# Patient Record
Sex: MALE | Race: BLACK OR AFRICAN AMERICAN | Employment: FULL TIME | URBAN - METROPOLITAN AREA
[De-identification: names, ages, dates, MRNs, and addresses within clinical notes are randomized per-mention and may not be internally consistent; named-entity substitution may affect disease eponyms.]

---

## 2018-10-03 ENCOUNTER — HOSPITAL ENCOUNTER (EMERGENCY)
Age: 48
Discharge: LEFT BEFORE DISCHARGE | End: 2018-10-03

## 2018-10-03 VITALS
RESPIRATION RATE: 20 BRPM | HEIGHT: 65 IN | SYSTOLIC BLOOD PRESSURE: 188 MMHG | BODY MASS INDEX: 24.98 KG/M2 | HEART RATE: 111 BPM | OXYGEN SATURATION: 97 % | DIASTOLIC BLOOD PRESSURE: 115 MMHG | TEMPERATURE: 98.9 F | WEIGHT: 149.91 LBS

## 2018-10-03 PROCEDURE — 4500000002 HC ER NO CHARGE

## 2020-09-20 ENCOUNTER — HOSPITAL ENCOUNTER (EMERGENCY)
Age: 50
Discharge: HOME | End: 2020-09-20
Attending: EMERGENCY MEDICINE
Payer: COMMERCIAL

## 2020-09-20 VITALS
TEMPERATURE: 99.1 F | HEART RATE: 69 BPM | DIASTOLIC BLOOD PRESSURE: 84 MMHG | SYSTOLIC BLOOD PRESSURE: 131 MMHG | WEIGHT: 141.09 LBS | RESPIRATION RATE: 20 BRPM | OXYGEN SATURATION: 97 % | HEIGHT: 68 IN | BODY MASS INDEX: 21.38 KG/M2

## 2020-09-20 PROCEDURE — 99283 EMERGENCY DEPT VISIT LOW MDM: CPT

## 2020-09-20 PROCEDURE — 6370000000 HC RX 637 (ALT 250 FOR IP): Performed by: EMERGENCY MEDICINE

## 2020-09-20 RX ORDER — IBUPROFEN 600 MG/1
600 TABLET ORAL ONCE
Status: COMPLETED | OUTPATIENT
Start: 2020-09-20 | End: 2020-09-20

## 2020-09-20 RX ORDER — METHOCARBAMOL 750 MG/1
750 TABLET, FILM COATED ORAL 4 TIMES DAILY
Qty: 40 TABLET | Refills: 0 | Status: SHIPPED | OUTPATIENT
Start: 2020-09-20 | End: 2020-09-30

## 2020-09-20 RX ORDER — METHOCARBAMOL 750 MG/1
1500 TABLET, FILM COATED ORAL ONCE
Status: COMPLETED | OUTPATIENT
Start: 2020-09-20 | End: 2020-09-20

## 2020-09-20 RX ADMIN — IBUPROFEN 600 MG: 600 TABLET, FILM COATED ORAL at 13:41

## 2020-09-20 RX ADMIN — METHOCARBAMOL TABLETS 1500 MG: 750 TABLET, COATED ORAL at 13:41

## 2020-09-20 SDOH — HEALTH STABILITY: MENTAL HEALTH: HOW OFTEN DO YOU HAVE A DRINK CONTAINING ALCOHOL?: NEVER

## 2020-09-20 ASSESSMENT — ENCOUNTER SYMPTOMS
BACK PAIN: 1
BLOOD IN STOOL: 0
STRIDOR: 0
NAUSEA: 0
WHEEZING: 0
ABDOMINAL PAIN: 1
PHOTOPHOBIA: 0
VOICE CHANGE: 0
COLOR CHANGE: 0
TROUBLE SWALLOWING: 0
SHORTNESS OF BREATH: 0
FACIAL SWELLING: 0
VOMITING: 0

## 2020-09-20 ASSESSMENT — PAIN DESCRIPTION - LOCATION: LOCATION: ABDOMEN;BACK

## 2020-09-20 ASSESSMENT — PAIN SCALES - GENERAL
PAINLEVEL_OUTOF10: 8
PAINLEVEL_OUTOF10: 8

## 2020-09-20 ASSESSMENT — PAIN DESCRIPTION - PAIN TYPE: TYPE: CHRONIC PAIN

## 2020-09-20 ASSESSMENT — PAIN DESCRIPTION - DESCRIPTORS: DESCRIPTORS: SORE

## 2020-09-20 NOTE — ED NOTES
DAVID has examined pt already. Pt still reports abd pain, lower back and left upper back pain at 8.   Ice pack given     Samra Gomez, EUSEBIO  09/20/20 EUSEBIO Mitchell  09/20/20 5015

## 2020-09-20 NOTE — LETTER
[address] [facility] [address]  Phone: [phone]             September 20, 2020    Patient: Divine Hudson   YOB: 1970   Date of Visit: 9/20/2020       To Whom It May Concern:    Divine Hudson was seen and treated in our emergency department on 9/20/2020. He was accompanied by his wife, Elle Vaughn from 12:24 pm until time of discharge at 2:00 pm.  The Emergency Room physician asked for Elle Vaughn to stay home from work today to take care of her , Divine Hudson.       Sincerely,             Signature:__________________________________

## 2020-09-20 NOTE — ED PROVIDER NOTES
urinating and dysuria. Musculoskeletal: Positive for arthralgias and back pain. Negative for gait problem and neck pain. Skin: Negative for color change and wound. Neurological: Negative for seizures, syncope and speech difficulty. Psychiatric/Behavioral: Negative for self-injury and suicidal ideas. Except as noted above the remainder of the review of systems was reviewed and negative. PAST MEDICAL HISTORY     Past Medical History:   Diagnosis Date    Hernia, abdominal     Unspecified cerebral artery occlusion with cerebral infarction          SURGICAL HISTORY     History reviewed. No pertinent surgical history. CURRENT MEDICATIONS       Previous Medications    ASPIRIN 81 MG CHEWABLE TABLET    Take 81 mg by mouth daily       ALLERGIES     Pcn [penicillins]    FAMILY HISTORY     History reviewed. No pertinent family history.        SOCIAL HISTORY       Social History     Socioeconomic History    Marital status:      Spouse name: None    Number of children: None    Years of education: None    Highest education level: None   Occupational History    None   Social Needs    Financial resource strain: None    Food insecurity     Worry: None     Inability: None    Transportation needs     Medical: None     Non-medical: None   Tobacco Use    Smoking status: Current Every Day Smoker     Packs/day: 1.00     Types: Cigarettes    Smokeless tobacco: Never Used   Substance and Sexual Activity    Alcohol use: Never     Frequency: Never    Drug use: Never    Sexual activity: None   Lifestyle    Physical activity     Days per week: None     Minutes per session: None    Stress: None   Relationships    Social connections     Talks on phone: None     Gets together: None     Attends Evangelical service: None     Active member of club or organization: None     Attends meetings of clubs or organizations: None     Relationship status: None    Intimate partner violence     Fear of current or ex partner: None     Emotionally abused: None     Physically abused: None     Forced sexual activity: None   Other Topics Concern    None   Social History Narrative    None         PHYSICAL EXAM    (up to 7 for level 4, 8 or more for level 5)     ED Triage Vitals [09/20/20 1237]   BP Temp Temp Source Pulse Resp SpO2 Height Weight   131/84 99.1 °F (37.3 °C) Oral 69 20 97 % 5' 8\" (1.727 m) 141 lb 1.5 oz (64 kg)       Physical Exam  Vitals signs and nursing note reviewed. Constitutional:       General: He is not in acute distress. Appearance: He is well-developed. HENT:      Head: Normocephalic and atraumatic. Right Ear: External ear normal.      Left Ear: External ear normal.   Eyes:      Conjunctiva/sclera: Conjunctivae normal.   Neck:      Musculoskeletal: Neck supple. Vascular: No JVD. Trachea: No tracheal deviation. Cardiovascular:      Rate and Rhythm: Normal rate. Pulmonary:      Effort: Pulmonary effort is normal. No respiratory distress. Breath sounds: Normal breath sounds. No wheezing. Abdominal:      General: There is no distension. Palpations: Abdomen is soft. Tenderness: There is generalized abdominal tenderness. There is no guarding or rebound. Comments: Mild diffuse abdominal tenderness to palpation. Defect in ventral abdominal wall palpated but no protruding or incarcerated hernia palpable. No rebound, guarding, peritoneal signs. Musculoskeletal: Normal range of motion. Right shoulder: He exhibits tenderness (Mild left-sided tenderness to the mid thoracic and lower lumbar spine with no midline tenderness. No palpable deformities. ). Skin:     General: Skin is warm and dry. Neurological:      General: No focal deficit present. Mental Status: He is alert and oriented to person, place, and time. Cranial Nerves: No cranial nerve deficit. Comments: No saddle anesthesia.   Sensation intact in all nerve distributions of bilateral lower extremities. Normal gait on own power. Plus patellar reflexes equal bilaterally. EMERGENCY DEPARTMENT COURSE and DIFFERENTIAL DIAGNOSIS/MDM:   Vitals:    Vitals:    09/20/20 1237   BP: 131/84   Pulse: 69   Resp: 20   Temp: 99.1 °F (37.3 °C)   TempSrc: Oral   SpO2: 97%   Weight: 141 lb 1.5 oz (64 kg)   Height: 5' 8\" (1.727 m)         MDM  Patient has no emergent back pain signs such as urinary or bowel incontinence, saddle anesthesia, leg weakness or numbness, or fever. Has no midline tenderness. I suspect mostly musculoskeletal pain. Patient does have some abdominal pain I have recommended laboratory evaluation, urinalysis, and CT abdomen pelvis for further evaluation as well as to give me a chance to treat his back pain and reevaluate him. The patient and his wife initially agreed to this however shortly after I leave the room the patient's wife comes out and reports that they will like to speak to me again. When I returned to the room both the patient and his wife state that he does not want an IV, does not want a blood work, and does not want a prolonged evaluation. With reevaluation. They report that instead they would like medications to help with his back pain and discharge. They report that they are confident they can get in with his primary care doctor tomorrow for further evaluation. I have discussed the risk, benefits, alternatives to this strategy including decreased diagnostic accuracy and possibly missing emergent abdominal or back pathology. The patient and his wife both expressed understanding and agreement with this plan. I feel they adequately understand these risks and have capacity to precipitate your medical decision making and refuse the recommended work-up. They are discharged home with a prescription for muscle relaxers, recommendations for close primary follow-up, and very strict ER return precautions for new or worsening symptoms.          Procedures    FINAL IMPRESSION      1. Acute left-sided thoracic back pain    2. Lumbar back pain    3. Generalized abdominal pain          DISPOSITION/PLAN   DISPOSITION Decision To Discharge 09/20/2020 01:30:17 PM      PATIENT REFERRED TO:  Ruddy Bryson MD  [address] [place] [address],[address] [facility] [address] [id number]  [phone]    In 1 day      [address]  [place] [address]  [phone]    If symptoms worsen      DISCHARGE MEDICATIONS:  New Prescriptions    METHOCARBAMOL (ROBAXIN-750) 750 MG TABLET    Take 1 tablet by mouth 4 times daily for 10 days Use as needed for muscle pain or soreness. May take 2 at bedtime to aid sleep. (Please note that portions of this note were completed with a voice recognition program.  Efforts were made to edit the dictations but occasionally words aremis-transcribed. )    Char Vargas MD (electronically signed)  Attending Emergency Physician           Char Vargas MD  09/20/20 0659

## 2020-09-20 NOTE — LETTER
[facility] [address]  Phone: [phone]             September 20, 2020    Patient: Jade Ventura   YOB: 1970   Date of Visit: 9/20/2020       To Whom It May Concern:    Jade Ventura was seen and treated in our emergency department on 9/20/2020. He may return to work on 9/22/2020.       Sincerely,             Signature:__________________________________

## 2020-09-24 NOTE — ED NOTES
Discharge instructions with pt. Back pain/abd pain teaching with pt. Explained rx. Home ambulatory. Gait steady.    Pain still at [address] [place] [address]  09/23/20 [address]ccullough

## 2021-05-26 ENCOUNTER — HOSPITAL ENCOUNTER (EMERGENCY)
Age: 51
Discharge: HOME | End: 2021-05-26
Attending: EMERGENCY MEDICINE

## 2021-05-26 ENCOUNTER — APPOINTMENT (OUTPATIENT)
Dept: CT IMAGING | Age: 51
End: 2021-05-26

## 2021-05-26 VITALS
HEIGHT: 65 IN | HEART RATE: 68 BPM | BODY MASS INDEX: 26.85 KG/M2 | TEMPERATURE: 98 F | SYSTOLIC BLOOD PRESSURE: 138 MMHG | WEIGHT: 161.16 LBS | RESPIRATION RATE: 16 BRPM | DIASTOLIC BLOOD PRESSURE: 82 MMHG | OXYGEN SATURATION: 98 %
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
## 2021-05-26 DIAGNOSIS — S09.90XA CLOSED HEAD INJURY WITHOUT LOSS OF CONSCIOUSNESS, INITIAL ENCOUNTER: ICD-10-CM

## 2021-05-26 DIAGNOSIS — R60.0 PERIORBITAL EDEMA OF LEFT EYE: ICD-10-CM

## 2021-05-26 PROCEDURE — 70450 CT HEAD/BRAIN W/O DYE: CPT

## 2021-05-26 PROCEDURE — 70486 CT MAXILLOFACIAL W/O DYE: CPT

## 2021-05-26 PROCEDURE — 71250 CT THORAX DX C-: CPT

## 2021-05-26 PROCEDURE — 6370000000 HC RX 637 (ALT 250 FOR IP): Performed by: EMERGENCY MEDICINE

## 2021-05-26 PROCEDURE — 72125 CT NECK SPINE W/O DYE: CPT

## 2021-05-26 PROCEDURE — 99285 EMERGENCY DEPT VISIT HI MDM: CPT

## 2021-05-26 RX ORDER — TETRACAINE HYDROCHLORIDE 5 MG/ML
2 SOLUTION OPHTHALMIC ONCE
Status: COMPLETED | OUTPATIENT
Start: 2021-05-26 | End: 2021-05-26

## 2021-05-26 RX ORDER — ACETAMINOPHEN 500 MG
500 TABLET ORAL ONCE
Status: COMPLETED | OUTPATIENT
Start: 2021-05-26 | End: 2021-05-26

## 2021-05-26 RX ADMIN — TETRACAINE HYDROCHLORIDE 2 DROP: 5 SOLUTION OPHTHALMIC at 18:29

## 2021-05-26 RX ADMIN — FLUORESCEIN SODIUM 1 MG: 1 STRIP OPHTHALMIC at 18:29

## 2021-05-26 RX ADMIN — ACETAMINOPHEN 500 MG: 500 TABLET ORAL at 18:29

## 2021-05-26 ASSESSMENT — VISUAL ACUITY
OD: 20/25
OS: 20/30
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
## 2023-11-13 ENCOUNTER — APPOINTMENT (OUTPATIENT)
Dept: CT IMAGING | Age: 53
End: 2023-11-13

## 2023-11-13 ENCOUNTER — HOSPITAL ENCOUNTER (EMERGENCY)
Age: 53
Discharge: HOME | End: 2023-11-13
Attending: STUDENT IN AN ORGANIZED HEALTH CARE EDUCATION/TRAINING PROGRAM

## 2023-11-13 VITALS
HEART RATE: 70 BPM | TEMPERATURE: 98.5 F | DIASTOLIC BLOOD PRESSURE: 83 MMHG | WEIGHT: 171.08 LBS | BODY MASS INDEX: 28.5 KG/M2 | OXYGEN SATURATION: 96 % | HEIGHT: 65 IN | RESPIRATION RATE: 20 BRPM | SYSTOLIC BLOOD PRESSURE: 102 MMHG

## 2023-11-13 DIAGNOSIS — R03.0 ELEVATED BLOOD PRESSURE READING: ICD-10-CM

## 2023-11-13 DIAGNOSIS — F17.200 SMOKER: ICD-10-CM

## 2023-11-13 DIAGNOSIS — M86.9 OSTEOMYELITIS OF OTHER SITE, UNSPECIFIED TYPE (HCC): ICD-10-CM
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
## 2023-11-13 LAB
ALBUMIN SERPL-MCNC: 4.3 G/DL (ref 3.4–5)
ALBUMIN/GLOB SERPL: 1.3 {RATIO} (ref 1.1–2.2)
ALP SERPL-CCNC: 121 U/L (ref 40–129)
ALT SERPL-CCNC: 65 U/L (ref 10–40)
ANION GAP SERPL CALCULATED.3IONS-SCNC: 14 MMOL/L (ref 3–16)
AST SERPL-CCNC: 26 U/L (ref 15–37)
BASOPHILS # BLD: 0.1 K/UL (ref 0–0.2)
BASOPHILS NFR BLD: 0.8 %
BILIRUB SERPL-MCNC: <0.2 MG/DL (ref 0–1)
BUN SERPL-MCNC: 10 MG/DL (ref 7–20)
CALCIUM SERPL-MCNC: 9.4 MG/DL (ref 8.3–10.6)
CHLORIDE SERPL-SCNC: 105 MMOL/L (ref 99–110)
CO2 SERPL-SCNC: 24 MMOL/L (ref 21–32)
CREAT SERPL-MCNC: 0.8 MG/DL (ref 0.9–1.3)
DEPRECATED RDW RBC AUTO: 14.2 % (ref 12.4–15.4)
EOSINOPHIL # BLD: 0 K/UL (ref 0–0.6)
EOSINOPHIL NFR BLD: 0.1 %
ETHANOLAMINE SERPL-MCNC: NORMAL MG/DL (ref 0–0.08)
GFR SERPLBLD CREATININE-BSD FMLA CKD-EPI: >60 ML/MIN/{1.73_M2}
GLUCOSE SERPL-MCNC: 122 MG/DL (ref 70–99)
HCT VFR BLD AUTO: 41.9 % (ref 40.5–52.5)
HGB BLD-MCNC: 14.2 G/DL (ref 13.5–17.5)
LYMPHOCYTES # BLD: 2.7 K/UL (ref 1–5.1)
LYMPHOCYTES NFR BLD: 27.1 %
MCH RBC QN AUTO: 29.8 PG (ref 26–34)
MCHC RBC AUTO-ENTMCNC: 33.8 G/DL (ref 31–36)
MCV RBC AUTO: 88.2 FL (ref 80–100)
MONOCYTES # BLD: 0.7 K/UL (ref 0–1.3)
MONOCYTES NFR BLD: 7.4 %
NEUTROPHILS # BLD: 6.3 K/UL (ref 1.7–7.7)
NEUTROPHILS NFR BLD: 64.6 %
PLATELET # BLD AUTO: 257 K/UL (ref 135–450)
PMV BLD AUTO: 7.1 FL (ref 5–10.5)
POTASSIUM SERPL-SCNC: 3.7 MMOL/L (ref 3.5–5.1)
PROT SERPL-MCNC: 7.5 G/DL (ref 6.4–8.2)
RBC # BLD AUTO: 4.75 M/UL (ref 4.2–5.9)
SODIUM SERPL-SCNC: 143 MMOL/L (ref 136–145)
WBC # BLD AUTO: 9.8 K/UL (ref 4–11)

## 2023-11-13 PROCEDURE — 6360000002 HC RX W HCPCS: Performed by: STUDENT IN AN ORGANIZED HEALTH CARE EDUCATION/TRAINING PROGRAM

## 2023-11-13 PROCEDURE — 2580000003 HC RX 258

## 2023-11-13 PROCEDURE — 64400 NJX AA&/STRD TRIGEMINAL NRV: CPT

## 2023-11-13 PROCEDURE — 6360000002 HC RX W HCPCS

## 2023-11-13 PROCEDURE — 36415 COLL VENOUS BLD VENIPUNCTURE: CPT

## 2023-11-13 PROCEDURE — 70450 CT HEAD/BRAIN W/O DYE: CPT

## 2023-11-13 PROCEDURE — 85025 COMPLETE CBC W/AUTO DIFF WBC: CPT

## 2023-11-13 PROCEDURE — 72125 CT NECK SPINE W/O DYE: CPT

## 2023-11-13 PROCEDURE — 96365 THER/PROPH/DIAG IV INF INIT: CPT

## 2023-11-13 PROCEDURE — 96375 TX/PRO/DX INJ NEW DRUG ADDON: CPT

## 2023-11-13 PROCEDURE — 99284 EMERGENCY DEPT VISIT MOD MDM: CPT

## 2023-11-13 PROCEDURE — 82077 ASSAY SPEC XCP UR&BREATH IA: CPT

## 2023-11-13 PROCEDURE — 70486 CT MAXILLOFACIAL W/O DYE: CPT

## 2023-11-13 PROCEDURE — 80053 COMPREHEN METABOLIC PANEL: CPT

## 2023-11-13 PROCEDURE — 96367 TX/PROPH/DG ADDL SEQ IV INF: CPT

## 2023-11-13 PROCEDURE — 2580000003 HC RX 258: Performed by: STUDENT IN AN ORGANIZED HEALTH CARE EDUCATION/TRAINING PROGRAM

## 2023-11-13 RX ORDER — MORPHINE SULFATE 2 MG/ML
2 INJECTION, SOLUTION INTRAMUSCULAR; INTRAVENOUS ONCE
Status: COMPLETED | OUTPATIENT
Start: 2023-11-13 | End: 2023-11-13

## 2023-11-13 RX ORDER — ONDANSETRON 2 MG/ML
4 INJECTION INTRAMUSCULAR; INTRAVENOUS EVERY 30 MIN PRN
Status: DISCONTINUED | OUTPATIENT
Start: 2023-11-13 | End: 2023-11-13 | Stop reason: HOSPADM

## 2023-11-13 RX ORDER — SODIUM CHLORIDE, SODIUM LACTATE, POTASSIUM CHLORIDE, AND CALCIUM CHLORIDE .6; .31; .03; .02 G/100ML; G/100ML; G/100ML; G/100ML
1000 INJECTION, SOLUTION INTRAVENOUS ONCE
Status: COMPLETED | OUTPATIENT
Start: 2023-11-13 | End: 2023-11-13

## 2023-11-13 RX ORDER — METFORMIN HYDROCHLORIDE 500 MG/1
500 TABLET, EXTENDED RELEASE ORAL
COMMUNITY

## 2023-11-13 RX ORDER — BACLOFEN 10 MG/1
10 TABLET ORAL PRN
COMMUNITY

## 2023-11-13 RX ADMIN — SODIUM CHLORIDE, POTASSIUM CHLORIDE, SODIUM LACTATE AND CALCIUM CHLORIDE 1000 ML: 600; 310; 30; 20 INJECTION, SOLUTION INTRAVENOUS at 18:18

## 2023-11-13 RX ADMIN — ONDANSETRON 4 MG: 2 INJECTION INTRAMUSCULAR; INTRAVENOUS at 18:15

## 2023-11-13 RX ADMIN — VANCOMYCIN HYDROCHLORIDE 1500 MG: 1.5 INJECTION, POWDER, LYOPHILIZED, FOR SOLUTION INTRAVENOUS at 19:38

## 2023-11-13 RX ADMIN — MORPHINE SULFATE 2 MG: 2 INJECTION, SOLUTION INTRAMUSCULAR; INTRAVENOUS at 18:15

## 2023-11-13 RX ADMIN — CEFTRIAXONE 2000 MG: 1 INJECTION, POWDER, FOR SOLUTION INTRAMUSCULAR; INTRAVENOUS at 18:44

## 2023-11-13 ASSESSMENT — LIFESTYLE VARIABLES
HOW MANY STANDARD DRINKS CONTAINING ALCOHOL DO YOU HAVE ON A TYPICAL DAY: PATIENT DOES NOT DRINK
HOW OFTEN DO YOU HAVE A DRINK CONTAINING ALCOHOL: NEVER

## 2023-11-13 ASSESSMENT — PAIN SCALES - GENERAL
PAINLEVEL_OUTOF10: 10
PAINLEVEL_OUTOF10: 8
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
## 2023-11-13 NOTE — ED NOTES
Pt arrives ambulatory to ED c/o upper left tooth pain starting this morning. Pt currently does not have a dentist. No pain meds PTA. Rates pain 8/10.       Efe Soriano RN  11/13/23 6247
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
## 2023-11-13 NOTE — PROGRESS NOTES
Clinical Pharmacy Note  Vancomycin Consult    Pharmacy consult received for one-time dose of vancomycin in the Emergency Department per Dr. Subha Arteaga. Ht Readings from Last 1 Encounters:   11/13/23 1.651 m (5' 5\")        Wt Readings from Last 1 Encounters:   11/13/23 77.6 kg (171 lb 1.2 oz)         Assessment/Plan:  Vancomycin 1500mg x 1 in ED. If vancomycin is to continue on admission and pharmacy is to manage dosing, please re-consult with admission orders.

## 2023-11-14 NOTE — DISCHARGE INSTRUCTIONS
Is very important you go to the appointment tomorrow. Please follow-up with the oral surgeon as we discussed.   Go straight to the ER for any worsening symptoms

## 2023-11-14 NOTE — ED NOTES
[id number] wife at bedside and updated. NP to bedside to answer further questions from wife. Rocephin finished and LR infusion resumed after IV line flushed.      Jeff Chino RN  11/13/23 1910

## 2025-05-21 ENCOUNTER — APPOINTMENT (OUTPATIENT)
Dept: CT IMAGING | Age: 55
DRG: 057 | End: 2025-05-21
Payer: COMMERCIAL

## 2025-05-21 ENCOUNTER — APPOINTMENT (OUTPATIENT)
Dept: GENERAL RADIOLOGY | Age: 55
DRG: 057 | End: 2025-05-21
Payer: COMMERCIAL

## 2025-05-21 ENCOUNTER — HOSPITAL ENCOUNTER (INPATIENT)
Age: 55
LOS: 1 days | Discharge: HOME | DRG: 057 | End: 2025-05-24
Attending: EMERGENCY MEDICINE | Admitting: INTERNAL MEDICINE
Payer: COMMERCIAL

## 2025-05-21 DIAGNOSIS — R29.90 STROKE-LIKE SYMPTOM: Primary | ICD-10-CM

## 2025-05-21 DIAGNOSIS — R79.89 ELEVATED LFTS: ICD-10-CM

## 2025-05-21 DIAGNOSIS — I16.0 HYPERTENSIVE URGENCY: ICD-10-CM

## 2025-05-21 DIAGNOSIS — I63.9 CEREBROVASCULAR ACCIDENT (CVA), UNSPECIFIED MECHANISM (HCC): ICD-10-CM

## 2025-05-21 LAB
ALBUMIN SERPL-MCNC: 4.3 G/DL (ref 3.4–5)
ALBUMIN/GLOB SERPL: 1.4 {RATIO} (ref 1.1–2.2)
ALP SERPL-CCNC: 152 U/L (ref 40–129)
ALT SERPL-CCNC: 143 U/L (ref 10–40)
ANION GAP SERPL CALCULATED.3IONS-SCNC: 12 MMOL/L (ref 3–16)
AST SERPL-CCNC: 41 U/L (ref 15–37)
BASOPHILS # BLD: 0.1 K/UL (ref 0–0.2)
BASOPHILS NFR BLD: 0.8 %
BILIRUB SERPL-MCNC: <0.2 MG/DL (ref 0–1)
BUN SERPL-MCNC: 8 MG/DL (ref 7–20)
CALCIUM SERPL-MCNC: 9.5 MG/DL (ref 8.3–10.6)
CHLORIDE SERPL-SCNC: 102 MMOL/L (ref 99–110)
CHP ED QC CHECK: YES
CO2 SERPL-SCNC: 27 MMOL/L (ref 21–32)
CREAT SERPL-MCNC: 0.9 MG/DL (ref 0.9–1.3)
DEPRECATED RDW RBC AUTO: 13.5 % (ref 12.4–15.4)
EOSINOPHIL # BLD: 0.1 K/UL (ref 0–0.6)
EOSINOPHIL NFR BLD: 0.9 %
ETHANOLAMINE SERPL-MCNC: NORMAL MG/DL (ref 0–0.08)
GFR SERPLBLD CREATININE-BSD FMLA CKD-EPI: >90 ML/MIN/{1.73_M2}
GLUCOSE BLD-MCNC: 127 MG/DL
GLUCOSE BLD-MCNC: 127 MG/DL (ref 70–99)
GLUCOSE SERPL-MCNC: 140 MG/DL (ref 70–99)
HCT VFR BLD AUTO: 41.5 % (ref 40.5–52.5)
HGB BLD-MCNC: 14 G/DL (ref 13.5–17.5)
INR PPP: 0.9 (ref 0.85–1.15)
LACTATE BLDV-SCNC: 1.9 MMOL/L (ref 0.4–1.9)
LYMPHOCYTES # BLD: 3.3 K/UL (ref 1–5.1)
LYMPHOCYTES NFR BLD: 38.6 %
MCH RBC QN AUTO: 29.6 PG (ref 26–34)
MCHC RBC AUTO-ENTMCNC: 33.7 G/DL (ref 31–36)
MCV RBC AUTO: 87.8 FL (ref 80–100)
MONOCYTES # BLD: 0.4 K/UL (ref 0–1.3)
MONOCYTES NFR BLD: 5.1 %
NEUTROPHILS # BLD: 4.7 K/UL (ref 1.7–7.7)
NEUTROPHILS NFR BLD: 54.6 %
PERFORMED ON: ABNORMAL
PLATELET # BLD AUTO: 290 K/UL (ref 135–450)
PMV BLD AUTO: 7.1 FL (ref 5–10.5)
POTASSIUM SERPL-SCNC: 4.1 MMOL/L (ref 3.5–5.1)
PROT SERPL-MCNC: 7.3 G/DL (ref 6.4–8.2)
PROTHROMBIN TIME: 12.3 SEC (ref 11.9–14.9)
RBC # BLD AUTO: 4.73 M/UL (ref 4.2–5.9)
SODIUM SERPL-SCNC: 141 MMOL/L (ref 136–145)
TROPONIN, HIGH SENSITIVITY: 15 NG/L (ref 0–22)
WBC # BLD AUTO: 8.6 K/UL (ref 4–11)

## 2025-05-21 PROCEDURE — 83605 ASSAY OF LACTIC ACID: CPT

## 2025-05-21 PROCEDURE — 36415 COLL VENOUS BLD VENIPUNCTURE: CPT

## 2025-05-21 PROCEDURE — 99285 EMERGENCY DEPT VISIT HI MDM: CPT

## 2025-05-21 PROCEDURE — 6360000004 HC RX CONTRAST MEDICATION: Performed by: EMERGENCY MEDICINE

## 2025-05-21 PROCEDURE — 85610 PROTHROMBIN TIME: CPT

## 2025-05-21 PROCEDURE — 93005 ELECTROCARDIOGRAM TRACING: CPT | Performed by: EMERGENCY MEDICINE

## 2025-05-21 PROCEDURE — 84484 ASSAY OF TROPONIN QUANT: CPT

## 2025-05-21 PROCEDURE — 82077 ASSAY SPEC XCP UR&BREATH IA: CPT

## 2025-05-21 PROCEDURE — 80053 COMPREHEN METABOLIC PANEL: CPT

## 2025-05-21 PROCEDURE — 71045 X-RAY EXAM CHEST 1 VIEW: CPT

## 2025-05-21 PROCEDURE — 70496 CT ANGIOGRAPHY HEAD: CPT

## 2025-05-21 PROCEDURE — 74176 CT ABD & PELVIS W/O CONTRAST: CPT

## 2025-05-21 PROCEDURE — 85025 COMPLETE CBC W/AUTO DIFF WBC: CPT

## 2025-05-21 PROCEDURE — 70450 CT HEAD/BRAIN W/O DYE: CPT

## 2025-05-21 RX ORDER — IOPAMIDOL 755 MG/ML
75 INJECTION, SOLUTION INTRAVASCULAR
Status: COMPLETED | OUTPATIENT
Start: 2025-05-21 | End: 2025-05-21

## 2025-05-21 RX ADMIN — IOPAMIDOL 75 ML: 755 INJECTION, SOLUTION INTRAVENOUS at 20:59

## 2025-05-21 ASSESSMENT — PAIN - FUNCTIONAL ASSESSMENT: PAIN_FUNCTIONAL_ASSESSMENT: NONE - DENIES PAIN

## 2025-05-22 ENCOUNTER — APPOINTMENT (OUTPATIENT)
Dept: GENERAL RADIOLOGY | Age: 55
DRG: 057 | End: 2025-05-22
Payer: COMMERCIAL

## 2025-05-22 ENCOUNTER — APPOINTMENT (OUTPATIENT)
Age: 55
DRG: 057 | End: 2025-05-22
Attending: INTERNAL MEDICINE
Payer: COMMERCIAL

## 2025-05-22 ENCOUNTER — APPOINTMENT (OUTPATIENT)
Dept: MRI IMAGING | Age: 55
DRG: 057 | End: 2025-05-22
Payer: COMMERCIAL

## 2025-05-22 PROBLEM — I61.9 CVA (CEREBROVASCULAR ACCIDENT DUE TO INTRACEREBRAL HEMORRHAGE) (HCC): Status: ACTIVE | Noted: 2025-05-22

## 2025-05-22 LAB
AMMONIA PLAS-SCNC: 39 UMOL/L (ref 16–60)
AMPHETAMINES UR QL SCN>1000 NG/ML: ABNORMAL
ANION GAP SERPL CALCULATED.3IONS-SCNC: 13 MMOL/L (ref 3–16)
BARBITURATES UR QL SCN>200 NG/ML: ABNORMAL
BASOPHILS # BLD: 0.1 K/UL (ref 0–0.2)
BASOPHILS NFR BLD: 0.9 %
BENZODIAZ UR QL SCN>200 NG/ML: ABNORMAL
BUN SERPL-MCNC: 8 MG/DL (ref 7–20)
CALCIUM SERPL-MCNC: 9.9 MG/DL (ref 8.3–10.6)
CANNABINOIDS UR QL SCN>50 NG/ML: POSITIVE
CHLORIDE SERPL-SCNC: 102 MMOL/L (ref 99–110)
CK SERPL-CCNC: 220 U/L (ref 39–308)
CO2 SERPL-SCNC: 27 MMOL/L (ref 21–32)
COCAINE UR QL SCN: ABNORMAL
CREAT SERPL-MCNC: 0.8 MG/DL (ref 0.9–1.3)
DEPRECATED RDW RBC AUTO: 13.8 % (ref 12.4–15.4)
DRUG SCREEN COMMENT UR-IMP: ABNORMAL
ECHO AO ASC DIAM: 2.9 CM
ECHO AO ASCENDING AORTA INDEX: 1.64 CM/M2
ECHO AO ROOT DIAM: 3.1 CM
ECHO AO ROOT INDEX: 1.75 CM/M2
ECHO AV AREA PEAK VELOCITY: 2 CM2
ECHO AV AREA VTI: 2.3 CM2
ECHO AV AREA/BSA PEAK VELOCITY: 1.1 CM2/M2
ECHO AV AREA/BSA VTI: 1.3 CM2/M2
ECHO AV MEAN GRADIENT: 4 MMHG
ECHO AV MEAN VELOCITY: 1 M/S
ECHO AV PEAK GRADIENT: 9 MMHG
ECHO AV PEAK VELOCITY: 1.5 M/S
ECHO AV VELOCITY RATIO: 0.67
ECHO AV VTI: 29.3 CM
ECHO BSA: 1.78 M2
ECHO EST RA PRESSURE: 8 MMHG
ECHO IVC EXP: 2 CM
ECHO LA AREA 2C: 12.6 CM2
ECHO LA AREA 4C: 12.6 CM2
ECHO LA MAJOR AXIS: 4.5 CM
ECHO LA MINOR AXIS: 4.7 CM
ECHO LA VOL BP: 28 ML (ref 18–58)
ECHO LA VOL MOD A2C: 27 ML (ref 18–58)
ECHO LA VOL MOD A4C: 28 ML (ref 18–58)
ECHO LA VOL/BSA BIPLANE: 16 ML/M2 (ref 16–34)
ECHO LA VOLUME INDEX MOD A2C: 15 ML/M2 (ref 16–34)
ECHO LA VOLUME INDEX MOD A4C: 16 ML/M2 (ref 16–34)
ECHO LV E' LATERAL VELOCITY: 11.4 CM/S
ECHO LV E' SEPTAL VELOCITY: 10.3 CM/S
ECHO LV EF PHYSICIAN: 63 %
ECHO LV FRACTIONAL SHORTENING: 36 % (ref 28–44)
ECHO LV INTERNAL DIMENSION DIASTOLE INDEX: 2.49 CM/M2
ECHO LV INTERNAL DIMENSION DIASTOLIC: 4.4 CM (ref 4.2–5.9)
ECHO LV INTERNAL DIMENSION SYSTOLIC INDEX: 1.58 CM/M2
ECHO LV INTERNAL DIMENSION SYSTOLIC: 2.8 CM
ECHO LV IVSD: 1.2 CM (ref 0.6–1)
ECHO LV MASS 2D: 168.9 G (ref 88–224)
ECHO LV MASS INDEX 2D: 95.4 G/M2 (ref 49–115)
ECHO LV POSTERIOR WALL DIASTOLIC: 1 CM (ref 0.6–1)
ECHO LV RELATIVE WALL THICKNESS RATIO: 0.45
ECHO LVOT AREA: 2.8 CM2
ECHO LVOT AV VTI INDEX: 0.77
ECHO LVOT DIAM: 1.9 CM
ECHO LVOT MEAN GRADIENT: 2 MMHG
ECHO LVOT PEAK GRADIENT: 4 MMHG
ECHO LVOT PEAK VELOCITY: 1 M/S
ECHO LVOT STROKE VOLUME INDEX: 36 ML/M2
ECHO LVOT SV: 63.8 ML
ECHO LVOT VTI: 22.5 CM
ECHO MV A VELOCITY: 0.88 M/S
ECHO MV AREA VTI: 2.1 CM2
ECHO MV E DECELERATION TIME (DT): 194 MS
ECHO MV E VELOCITY: 0.97 M/S
ECHO MV E/A RATIO: 1.1
ECHO MV E/E' LATERAL: 8.51
ECHO MV E/E' RATIO (AVERAGED): 8.96
ECHO MV E/E' SEPTAL: 9.42
ECHO MV LVOT VTI INDEX: 1.32
ECHO MV MAX VELOCITY: 1 M/S
ECHO MV MEAN GRADIENT: 2 MMHG
ECHO MV MEAN VELOCITY: 0.6 M/S
ECHO MV PEAK GRADIENT: 4 MMHG
ECHO MV VTI: 29.8 CM
ECHO PV MAX VELOCITY: 1.1 M/S
ECHO PV PEAK GRADIENT: 5 MMHG
ECHO RA AREA 4C: 18.5 CM2
ECHO RA END SYSTOLIC VOLUME APICAL 4 CHAMBER INDEX BSA: 30 ML/M2
ECHO RA VOLUME: 53 ML
ECHO RIGHT VENTRICULAR SYSTOLIC PRESSURE (RVSP): 30 MMHG
ECHO RV BASAL DIMENSION: 3.8 CM
ECHO RV FREE WALL PEAK S': 11.7 CM/S
ECHO RV MID DIMENSION: 2.7 CM
ECHO RV TAPSE: 2.4 CM (ref 1.7–?)
ECHO TV REGURGITANT MAX VELOCITY: 2.32 M/S
ECHO TV REGURGITANT PEAK GRADIENT: 22 MMHG
EKG ATRIAL RATE: 65 BPM
EKG ATRIAL RATE: 84 BPM
EKG ATRIAL RATE: 90 BPM
EKG ATRIAL RATE: 98 BPM
EKG ATRIAL RATE: 99 BPM
EKG DIAGNOSIS: NORMAL
EKG P AXIS: 51 DEGREES
EKG P AXIS: 53 DEGREES
EKG P AXIS: 56 DEGREES
EKG P AXIS: 60 DEGREES
EKG P AXIS: 70 DEGREES
EKG P-R INTERVAL: 172 MS
EKG P-R INTERVAL: 172 MS
EKG P-R INTERVAL: 178 MS
EKG P-R INTERVAL: 180 MS
EKG P-R INTERVAL: 188 MS
EKG Q-T INTERVAL: 334 MS
EKG Q-T INTERVAL: 336 MS
EKG Q-T INTERVAL: 338 MS
EKG Q-T INTERVAL: 350 MS
EKG Q-T INTERVAL: 384 MS
EKG QRS DURATION: 76 MS
EKG QRS DURATION: 76 MS
EKG QRS DURATION: 78 MS
EKG QRS DURATION: 80 MS
EKG QRS DURATION: 80 MS
EKG QTC CALCULATION (BAZETT): 399 MS
EKG QTC CALCULATION (BAZETT): 413 MS
EKG QTC CALCULATION (BAZETT): 413 MS
EKG QTC CALCULATION (BAZETT): 428 MS
EKG QTC CALCULATION (BAZETT): 428 MS
EKG R AXIS: -1 DEGREES
EKG R AXIS: 19 DEGREES
EKG R AXIS: 22 DEGREES
EKG R AXIS: 4 DEGREES
EKG R AXIS: 51 DEGREES
EKG T AXIS: 26 DEGREES
EKG T AXIS: 32 DEGREES
EKG T AXIS: 37 DEGREES
EKG T AXIS: 40 DEGREES
EKG T AXIS: 51 DEGREES
EKG VENTRICULAR RATE: 65 BPM
EKG VENTRICULAR RATE: 84 BPM
EKG VENTRICULAR RATE: 90 BPM
EKG VENTRICULAR RATE: 98 BPM
EKG VENTRICULAR RATE: 99 BPM
EOSINOPHIL # BLD: 0 K/UL (ref 0–0.6)
EOSINOPHIL NFR BLD: 0.4 %
FENTANYL SCREEN, URINE: POSITIVE
GFR SERPLBLD CREATININE-BSD FMLA CKD-EPI: >90 ML/MIN/{1.73_M2}
GLUCOSE BLD-MCNC: 112 MG/DL (ref 70–99)
GLUCOSE BLD-MCNC: 119 MG/DL (ref 70–99)
GLUCOSE BLD-MCNC: 134 MG/DL (ref 70–99)
GLUCOSE BLD-MCNC: 150 MG/DL (ref 70–99)
GLUCOSE BLD-MCNC: 186 MG/DL (ref 70–99)
GLUCOSE SERPL-MCNC: 129 MG/DL (ref 70–99)
HCT VFR BLD AUTO: 44 % (ref 40.5–52.5)
HGB BLD-MCNC: 14.4 G/DL (ref 13.5–17.5)
LACTATE BLDV-SCNC: 1.6 MMOL/L (ref 0.4–2)
LACTATE BLDV-SCNC: 3.1 MMOL/L (ref 0.4–2)
LYMPHOCYTES # BLD: 4.3 K/UL (ref 1–5.1)
LYMPHOCYTES NFR BLD: 48.6 %
MCH RBC QN AUTO: 28.8 PG (ref 26–34)
MCHC RBC AUTO-ENTMCNC: 32.8 G/DL (ref 31–36)
MCV RBC AUTO: 87.9 FL (ref 80–100)
METHADONE UR QL SCN>300 NG/ML: ABNORMAL
MONOCYTES # BLD: 0.9 K/UL (ref 0–1.3)
MONOCYTES NFR BLD: 10.3 %
NEUTROPHILS # BLD: 3.5 K/UL (ref 1.7–7.7)
NEUTROPHILS NFR BLD: 39.8 %
OPIATES UR QL SCN>300 NG/ML: ABNORMAL
OXYCODONE UR QL SCN: ABNORMAL
PCP UR QL SCN>25 NG/ML: ABNORMAL
PERFORMED ON: ABNORMAL
PH UR STRIP: 7 [PH]
PLATELET # BLD AUTO: 275 K/UL (ref 135–450)
PMV BLD AUTO: 7.8 FL (ref 5–10.5)
POTASSIUM SERPL-SCNC: 3.6 MMOL/L (ref 3.5–5.1)
PROCALCITONIN SERPL IA-MCNC: 0.05 NG/ML (ref 0–0.15)
RBC # BLD AUTO: 5.01 M/UL (ref 4.2–5.9)
REPORT: NORMAL
RESP PATH DNA+RNA PNL NPH NAA+NON-PROBE: NORMAL
SODIUM SERPL-SCNC: 142 MMOL/L (ref 136–145)
TROPONIN, HIGH SENSITIVITY: 22 NG/L (ref 0–22)
TROPONIN, HIGH SENSITIVITY: 23 NG/L (ref 0–22)
TROPONIN, HIGH SENSITIVITY: 26 NG/L (ref 0–22)
WBC # BLD AUTO: 8.9 K/UL (ref 4–11)

## 2025-05-22 PROCEDURE — 83605 ASSAY OF LACTIC ACID: CPT

## 2025-05-22 PROCEDURE — 96375 TX/PRO/DX INJ NEW DRUG ADDON: CPT

## 2025-05-22 PROCEDURE — G0378 HOSPITAL OBSERVATION PER HR: HCPCS

## 2025-05-22 PROCEDURE — 36415 COLL VENOUS BLD VENIPUNCTURE: CPT

## 2025-05-22 PROCEDURE — 97161 PT EVAL LOW COMPLEX 20 MIN: CPT | Performed by: PHYSICAL THERAPIST

## 2025-05-22 PROCEDURE — 96366 THER/PROPH/DIAG IV INF ADDON: CPT

## 2025-05-22 PROCEDURE — 99223 1ST HOSP IP/OBS HIGH 75: CPT | Performed by: INTERNAL MEDICINE

## 2025-05-22 PROCEDURE — 6370000000 HC RX 637 (ALT 250 FOR IP): Performed by: HOSPITALIST

## 2025-05-22 PROCEDURE — 92526 ORAL FUNCTION THERAPY: CPT

## 2025-05-22 PROCEDURE — 93306 TTE W/DOPPLER COMPLETE: CPT

## 2025-05-22 PROCEDURE — 82140 ASSAY OF AMMONIA: CPT

## 2025-05-22 PROCEDURE — 93010 ELECTROCARDIOGRAM REPORT: CPT | Performed by: INTERNAL MEDICINE

## 2025-05-22 PROCEDURE — 0202U NFCT DS 22 TRGT SARS-COV-2: CPT

## 2025-05-22 PROCEDURE — 2580000003 HC RX 258: Performed by: INTERNAL MEDICINE

## 2025-05-22 PROCEDURE — 6370000000 HC RX 637 (ALT 250 FOR IP): Performed by: INTERNAL MEDICINE

## 2025-05-22 PROCEDURE — 92610 EVALUATE SWALLOWING FUNCTION: CPT

## 2025-05-22 PROCEDURE — 71045 X-RAY EXAM CHEST 1 VIEW: CPT

## 2025-05-22 PROCEDURE — 9990000010 HC NO CHARGE VISIT

## 2025-05-22 PROCEDURE — 92507 TX SP LANG VOICE COMM INDIV: CPT

## 2025-05-22 PROCEDURE — 84484 ASSAY OF TROPONIN QUANT: CPT

## 2025-05-22 PROCEDURE — 70551 MRI BRAIN STEM W/O DYE: CPT

## 2025-05-22 PROCEDURE — 96365 THER/PROPH/DIAG IV INF INIT: CPT

## 2025-05-22 PROCEDURE — 93306 TTE W/DOPPLER COMPLETE: CPT | Performed by: INTERNAL MEDICINE

## 2025-05-22 PROCEDURE — 82550 ASSAY OF CK (CPK): CPT

## 2025-05-22 PROCEDURE — 93005 ELECTROCARDIOGRAM TRACING: CPT | Performed by: HOSPITALIST

## 2025-05-22 PROCEDURE — 6360000002 HC RX W HCPCS: Performed by: HOSPITALIST

## 2025-05-22 PROCEDURE — 2500000003 HC RX 250 WO HCPCS: Performed by: HOSPITALIST

## 2025-05-22 PROCEDURE — 84145 PROCALCITONIN (PCT): CPT

## 2025-05-22 PROCEDURE — 96372 THER/PROPH/DIAG INJ SC/IM: CPT

## 2025-05-22 PROCEDURE — 6360000002 HC RX W HCPCS: Performed by: INTERNAL MEDICINE

## 2025-05-22 PROCEDURE — 92523 SPEECH SOUND LANG COMPREHEN: CPT

## 2025-05-22 PROCEDURE — 93005 ELECTROCARDIOGRAM TRACING: CPT

## 2025-05-22 PROCEDURE — 80307 DRUG TEST PRSMV CHEM ANLYZR: CPT

## 2025-05-22 PROCEDURE — 93005 ELECTROCARDIOGRAM TRACING: CPT | Performed by: EMERGENCY MEDICINE

## 2025-05-22 PROCEDURE — 94760 N-INVAS EAR/PLS OXIMETRY 1: CPT

## 2025-05-22 PROCEDURE — 2140000000 HC CCU INTERMEDIATE R&B

## 2025-05-22 PROCEDURE — 97530 THERAPEUTIC ACTIVITIES: CPT | Performed by: PHYSICAL THERAPIST

## 2025-05-22 PROCEDURE — 6360000002 HC RX W HCPCS

## 2025-05-22 PROCEDURE — 85025 COMPLETE CBC W/AUTO DIFF WBC: CPT

## 2025-05-22 PROCEDURE — 80048 BASIC METABOLIC PNL TOTAL CA: CPT

## 2025-05-22 RX ORDER — BACLOFEN 10 MG/1
10 TABLET ORAL PRN
Status: DISCONTINUED | OUTPATIENT
Start: 2025-05-22 | End: 2025-05-22

## 2025-05-22 RX ORDER — SODIUM CHLORIDE 0.9 % (FLUSH) 0.9 %
5-40 SYRINGE (ML) INJECTION PRN
Status: DISCONTINUED | OUTPATIENT
Start: 2025-05-22 | End: 2025-05-24 | Stop reason: HOSPADM

## 2025-05-22 RX ORDER — ONDANSETRON 4 MG/1
4 TABLET, ORALLY DISINTEGRATING ORAL EVERY 8 HOURS PRN
Status: DISCONTINUED | OUTPATIENT
Start: 2025-05-22 | End: 2025-05-24 | Stop reason: HOSPADM

## 2025-05-22 RX ORDER — GABAPENTIN 250 MG/5ML
600 SOLUTION ORAL 4 TIMES DAILY
Status: DISCONTINUED | OUTPATIENT
Start: 2025-05-22 | End: 2025-05-22

## 2025-05-22 RX ORDER — SODIUM CHLORIDE 0.9 % (FLUSH) 0.9 %
5-40 SYRINGE (ML) INJECTION EVERY 12 HOURS SCHEDULED
Status: DISCONTINUED | OUTPATIENT
Start: 2025-05-22 | End: 2025-05-24 | Stop reason: HOSPADM

## 2025-05-22 RX ORDER — ASPIRIN 81 MG/1
81 TABLET, CHEWABLE ORAL DAILY
Status: DISCONTINUED | OUTPATIENT
Start: 2025-05-22 | End: 2025-05-24 | Stop reason: HOSPADM

## 2025-05-22 RX ORDER — DEXTROSE MONOHYDRATE 100 MG/ML
INJECTION, SOLUTION INTRAVENOUS CONTINUOUS PRN
Status: DISCONTINUED | OUTPATIENT
Start: 2025-05-22 | End: 2025-05-24 | Stop reason: HOSPADM

## 2025-05-22 RX ORDER — NICOTINE 21 MG/24HR
1 PATCH, TRANSDERMAL 24 HOURS TRANSDERMAL DAILY
Status: DISCONTINUED | OUTPATIENT
Start: 2025-05-22 | End: 2025-05-24 | Stop reason: HOSPADM

## 2025-05-22 RX ORDER — LORAZEPAM 2 MG/ML
0.5 INJECTION INTRAMUSCULAR ONCE
Status: COMPLETED | OUTPATIENT
Start: 2025-05-22 | End: 2025-05-22

## 2025-05-22 RX ORDER — POLYETHYLENE GLYCOL 3350 17 G/17G
17 POWDER, FOR SOLUTION ORAL DAILY PRN
Status: DISCONTINUED | OUTPATIENT
Start: 2025-05-22 | End: 2025-05-24 | Stop reason: HOSPADM

## 2025-05-22 RX ORDER — ONDANSETRON 2 MG/ML
4 INJECTION INTRAMUSCULAR; INTRAVENOUS EVERY 6 HOURS PRN
Status: DISCONTINUED | OUTPATIENT
Start: 2025-05-22 | End: 2025-05-24 | Stop reason: HOSPADM

## 2025-05-22 RX ORDER — LORAZEPAM 2 MG/ML
INJECTION INTRAMUSCULAR
Status: DISPENSED
Start: 2025-05-22 | End: 2025-05-22

## 2025-05-22 RX ORDER — PREGABALIN 50 MG/1
50 CAPSULE ORAL 3 TIMES DAILY
COMMUNITY

## 2025-05-22 RX ORDER — GLUCAGON 1 MG/ML
1 KIT INJECTION PRN
Status: DISCONTINUED | OUTPATIENT
Start: 2025-05-22 | End: 2025-05-24 | Stop reason: HOSPADM

## 2025-05-22 RX ORDER — HYDROCHLOROTHIAZIDE 12.5 MG/1
12.5 CAPSULE ORAL DAILY
COMMUNITY

## 2025-05-22 RX ORDER — TENOFOVIR DISOPROXIL FUMARATE 300 MG/1
300 TABLET, FILM COATED ORAL DAILY
COMMUNITY

## 2025-05-22 RX ORDER — LORAZEPAM 2 MG/ML
0.5 INJECTION INTRAMUSCULAR EVERY 6 HOURS PRN
Status: DISCONTINUED | OUTPATIENT
Start: 2025-05-22 | End: 2025-05-22

## 2025-05-22 RX ORDER — LORAZEPAM 2 MG/ML
INJECTION INTRAMUSCULAR
Status: COMPLETED | OUTPATIENT
Start: 2025-05-22 | End: 2025-05-22

## 2025-05-22 RX ORDER — INSULIN LISPRO 100 [IU]/ML
0-4 INJECTION, SOLUTION INTRAVENOUS; SUBCUTANEOUS
Status: DISCONTINUED | OUTPATIENT
Start: 2025-05-22 | End: 2025-05-24 | Stop reason: HOSPADM

## 2025-05-22 RX ORDER — ASPIRIN 300 MG/1
300 SUPPOSITORY RECTAL DAILY
Status: DISCONTINUED | OUTPATIENT
Start: 2025-05-22 | End: 2025-05-24 | Stop reason: HOSPADM

## 2025-05-22 RX ORDER — M-VIT,TX,IRON,MINS/CALC/FOLIC 27MG-0.4MG
1 TABLET ORAL DAILY
COMMUNITY

## 2025-05-22 RX ORDER — ROSUVASTATIN CALCIUM 10 MG/1
40 TABLET, COATED ORAL NIGHTLY
Status: DISCONTINUED | OUTPATIENT
Start: 2025-05-22 | End: 2025-05-24 | Stop reason: HOSPADM

## 2025-05-22 RX ORDER — ENOXAPARIN SODIUM 100 MG/ML
40 INJECTION SUBCUTANEOUS DAILY
Status: DISCONTINUED | OUTPATIENT
Start: 2025-05-22 | End: 2025-05-24 | Stop reason: HOSPADM

## 2025-05-22 RX ORDER — 0.9 % SODIUM CHLORIDE 0.9 %
30 INTRAVENOUS SOLUTION INTRAVENOUS ONCE
Status: COMPLETED | OUTPATIENT
Start: 2025-05-22 | End: 2025-05-22

## 2025-05-22 RX ORDER — TENOFOVIR DISOPROXIL FUMARATE 300 MG/1
300 TABLET, FILM COATED ORAL DAILY
Status: DISCONTINUED | OUTPATIENT
Start: 2025-05-22 | End: 2025-05-24 | Stop reason: HOSPADM

## 2025-05-22 RX ORDER — SODIUM CHLORIDE 9 MG/ML
INJECTION, SOLUTION INTRAVENOUS PRN
Status: DISCONTINUED | OUTPATIENT
Start: 2025-05-22 | End: 2025-05-24 | Stop reason: HOSPADM

## 2025-05-22 RX ORDER — ROSUVASTATIN CALCIUM 20 MG/1
20 TABLET, COATED ORAL DAILY
COMMUNITY

## 2025-05-22 RX ADMIN — CEFEPIME 2000 MG: 2 INJECTION, POWDER, FOR SOLUTION INTRAVENOUS at 17:54

## 2025-05-22 RX ADMIN — LORAZEPAM 0.5 MG: 2 INJECTION INTRAMUSCULAR; INTRAVENOUS at 09:30

## 2025-05-22 RX ADMIN — ASPIRIN 81 MG: 81 TABLET, CHEWABLE ORAL at 09:10

## 2025-05-22 RX ADMIN — ROSUVASTATIN CALCIUM 40 MG: 10 TABLET, FILM COATED ORAL at 20:53

## 2025-05-22 RX ADMIN — TENOFOVIR DISPROXIL FUMARATE 300 MG: 300 TABLET ORAL at 17:55

## 2025-05-22 RX ADMIN — SODIUM CHLORIDE, PRESERVATIVE FREE 10 ML: 5 INJECTION INTRAVENOUS at 20:54

## 2025-05-22 RX ADMIN — CEFEPIME 2000 MG: 2 INJECTION, POWDER, FOR SOLUTION INTRAVENOUS at 10:30

## 2025-05-22 RX ADMIN — LORAZEPAM 0.5 MG: 2 INJECTION INTRAMUSCULAR; INTRAVENOUS at 09:32

## 2025-05-22 RX ADMIN — ENOXAPARIN SODIUM 40 MG: 100 INJECTION SUBCUTANEOUS at 09:11

## 2025-05-22 RX ADMIN — SODIUM CHLORIDE, PRESERVATIVE FREE 10 ML: 5 INJECTION INTRAVENOUS at 09:12

## 2025-05-22 RX ADMIN — SODIUM CHLORIDE 1845 ML: 0.9 INJECTION, SOLUTION INTRAVENOUS at 13:37

## 2025-05-22 RX ADMIN — INSULIN LISPRO 1 UNITS: 100 INJECTION, SOLUTION INTRAVENOUS; SUBCUTANEOUS at 20:53

## 2025-05-22 ASSESSMENT — PAIN SCALES - GENERAL
PAINLEVEL_OUTOF10: 4
PAINLEVEL_OUTOF10: 4

## 2025-05-22 ASSESSMENT — PAIN - FUNCTIONAL ASSESSMENT: PAIN_FUNCTIONAL_ASSESSMENT: ACTIVITIES ARE NOT PREVENTED

## 2025-05-22 ASSESSMENT — PAIN DESCRIPTION - DESCRIPTORS: DESCRIPTORS: ACHING

## 2025-05-22 ASSESSMENT — PAIN DESCRIPTION - FREQUENCY: FREQUENCY: CONTINUOUS

## 2025-05-22 ASSESSMENT — PAIN DESCRIPTION - ONSET: ONSET: ON-GOING

## 2025-05-22 ASSESSMENT — PAIN DESCRIPTION - ORIENTATION: ORIENTATION: MID

## 2025-05-22 ASSESSMENT — PAIN DESCRIPTION - LOCATION
LOCATION: CHEST
LOCATION: CHEST

## 2025-05-22 ASSESSMENT — PAIN DESCRIPTION - DIRECTION: RADIATING_TOWARDS: NECK

## 2025-05-22 NOTE — ED PROVIDER NOTES
EMERGENCY DEPARTMENT ENCOUNTER     [facility] EMERGENCY DEPARTMENT     Pt Name: Cassia Morris   MRN: [id number]   Birthdate 1970   Date of evaluation: 5/21/2025   Provider: Adolfo Díaz MD   PCP: Yon Arellano MD   Note Started: 8:49 PM EDT 5/21/25     CHIEF COMPLAINT     Chief Complaint   Patient presents with    Tachycardia     Pt states that he has a history of a stroke. Pt states that he has residual left sided weakness, left sided numbness in face, arm, leg,foot,and memory loss. Pt states that he feels different. Pt states symptoms started 2 hours ago.         HISTORY OF PRESENT ILLNESS:  History from : Patient        Cassia Morris is a 54 y.o. male who presents for evaluation of chest pain palpitations and sensory changes and weakness to the left side of his body.  Patient reports that symptom onset was approximately 2 hours prior to presentation.  States that his symptoms seem to be worsening since onset.  He reports he had similar symptoms 2 days prior that resolved spontaneously.  He reports a history of previous stroke that affected the left side of his body.  He did have chronic left-sided weakness but states that the weakness is worse than normal.  Denies recent head injury or trauma.  He is does not take anticoagulants but is on aspirin.  Patient also reports having left-sided chest pain.  He states that he feels that his heart is racing.     Nursing Notes were all reviewed and agreed with or any disagreements were addressed in the HPI.     ROS: Positives and Pertinent negatives as per HPI.    PAST MEDICAL HISTORY     Past medical history:  has a past medical history of Hepatitis B, Hernia, abdominal, and Unspecified cerebral artery occlusion with cerebral infarction.    Past surgical history:  has no past surgical history on file.    Med list:   No current facility-administered medications on file prior to encounter.     Current Outpatient Medications on File Prior to Encounter

## 2025-05-22 NOTE — PROGRESS NOTES
Physical Therapy    [facility] - Physical Therapy   Phone: [phone]    Physical Therapy  Facility/Department:[address] PROGRESSIVE CARE    [x] Initial Evaluation            [x] Daily Treatment Note         [] Discharge Summary      Patient: Cassia Morris   : 1970   MRN: [id number]   Date of Service:  2025  Staff Mobility Recommendation: SNA with A of 1    AM-PAC score: 19/24  Discharge Recommendations: Home with HHPT and assist from wife  Cassia Morris scored a 19/24 on the AM-PAC short mobility form. Current research shows that an AM-PAC score of 18 or greater is typically associated with a discharge to the patient's home setting. Based on the patient's AM-PAC score and their current functional mobility deficits, it is recommended that the patient have 2-3 sessions per week of Physical Therapy at d/c to increase the patient's independence.  At this time, this patient demonstrates the endurance and safety to discharge home with Home PT and a follow up treatment frequency of 2-3x/wk.  Please see assessment section for further patient specific details.    If patient discharges prior to next session this note will serve as a discharge summary.  Please see below for the latest assessment towards goals.       Admitting Diagnosis: CVA (cerebrovascular accident due to intracerebral hemorrhage) ([facility])  Ordering Physician: Dr Nguyen  Current Admission Summary: 54m with history of prior history of CVA, continued tobacco abuse, presents to the ER with L facial numbness and weakness. Patient states he had episode of weakness 2 nights ago, which resolved only to recur this evening around 7pm prompting ER consultation. Facial weakness has been associated with difficulty swallowing. Patient has also had chest pain, left sided, intermittent for several days as well.     Past Medical History:  has a past medical history of Hepatitis B, Hernia, abdominal, and Unspecified cerebral artery occlusion with cerebral

## 2025-05-22 NOTE — PROGRESS NOTES
4 Eyes Skin Assessment     NAME:  Cassia Morris  YOB: 1970  MEDICAL RECORD NUMBER:  [id number]    The patient is being assessed for  Admission    I agree that at least one RN has performed a thorough Head to Toe Skin Assessment on the patient. ALL assessment sites listed below have been assessed.      Areas assessed by both nurses:    Head, Face, Ears, Shoulders, Back, Chest, Arms, Elbows, Hands, Sacrum. Buttock, Coccyx, Ischium, and Legs. Feet and Heels        Does the Patient have a Wound? No noted wound(s)       Ranjith Prevention initiated by RN: No  Wound Care Orders initiated by RN: No    Pressure Injury (Stage 3,4, Unstageable, DTI, NWPT, and Complex wounds) if present, place Wound referral order by RN under : No    New Ostomies, if present place, Ostomy referral order under : No     Nurse 1 eSignature: Electronically signed by Izabella Salazar RN on 5/22/25 at 4:22 AM EDT    **SHARE this note so that the co-signing nurse can place an eSignature**    Nurse 2 eSignature: Electronically signed by CRISTIANA IVORY RN on 5/22/25 at 4:25 AM EDT

## 2025-05-22 NOTE — PROGRESS NOTES
Facility/Department: [address] CARE  SLP Clinical Swallow Evaluation and Speech Language Cognitive Assessment     Patient: Cassia Morris   : 1970   MRN: [id number]      Evaluation Date: 2025      Admitting Dx:   CVA (cerebrovascular accident due to intracerebral hemorrhage) (HCC) [I61.9]  Elevated LFTs [R79.89]  Hypertensive urgency [I16.0]  Stroke-like symptom [R29.90]   has a past medical history of Hepatitis B, Hernia, abdominal, and Unspecified cerebral artery occlusion with cerebral infarction.   has no past surgical history on file.  Allergies: medication allergies noted  Dysphagia History including instrumental assessment: no SLP notes on record  GI history: initial GI visit (2024) d/t new dysphagia complaint (heartburn x 3 months with some dysphagia (mid swallow, liquids/solids), Intermittent chest discomfort x 8 months); 10/11/2024 EUS/EGD: \"No endoscopic abnormality was evident in the esophagus to explain the patient's complaint of dysphagia. Biopsies were obtained from the proximal and distal esophagus with cold forceps for histology of suspected eosinophilic esophagitis. Diffuse mild inflammation characterized by congestion (edema) and erythema was found in the gastric antrum. Random biopsies were obtained in the gastric body, at the incisura and in the gastric antrum with cold forceps for histology. The duodenal bulb and second portion of the duodenum were normal.\" Most recently seen 2025: \"Still has daily nausea with vomiting 2-3 days week (multiple times/day, non bloody, sometimes bilious). Ondansetron not as helpful, promethazine is but sedating. Eating less, wt stable. Dysphagia still same (solids, liquids, pills), no odynophagia. Heartburn better.\" A/P notes with potential need for repeat scope, concern for metformin contributing to GI sx, rec to continue ondansetron and promethazine (with caution), avoid NSAIDs, H pylori eradication test on hold d/t PPI  Baseline/Prior  Not assessed d/t med status changes during assessment; concern for reduced recall from baseline with potential for reduced problem solving and reasoning as well.    Medical Or Cognitive/Behavioral Factors Which Can Exacerbate:   Comorbidities  Med status  GI involvements    Per chart and history provided, patient appears to have a status change contributing to below baseline level of function    Dysphagia Prognosis: good, guarded  Barriers to Progress: co-morbidities, GI involvements    SLP treatment Prognosis: good, guarded  Barriers to Progress:  co-morbidities, medical dx, concern for altered status during assessment    RECOMMENDATIONS     Recommended Diet and Intervention if deemed medically appropriate pending medical status changes:  Diet Solids Recommendation:  Regular texture Liquid Consistency Recommendation:  Thin liquids   Recommended form of Meds:   PO per patient preference      Compensatory Swallowing Strategies:  Eat or Feed Slowly, Remain Upright 30-45 min , Small Bites and Sips , Upright as possible with all PO intake     Eating Assistance Recommendation: Supervision or touching assistance    Treatment recommendations: Cognitive-Linguistic, Motor speech / Voice , Dysphagia  Frequency of treatment: 2-5 times/week    Dysphagia Therapeutic Intervention: Diet Tolerance Monitoring , Patient/Family Education   SLP Therapeutic Intervention: Dysarthria, Cognitive-Linguistic    Discharge Recommendations:  Recommend ongoing SLP for speech therapy upon discharge from the hospital     GOALS / PLAN     GOALS:  SHORT TERM DYSPHAGIA GOALS  Pt will functionally tolerate recommended diet with no overt clinical s/s of aspiration  Patient will demonstrate carryover of specific compensatory swallow strategies (ie, postures, techniques,...) with set-up  SHORT TERM SPEECH/LANGUAGE/COGNITIVE GOALS  Patient will demonstrate improved intelligibility of multi-syllable utterances, via oral motor and oral motor speech

## 2025-05-22 NOTE — PROGRESS NOTES
4 Eyes Skin Assessment     NAME:  Cassia Morris  YOB: 1970  MEDICAL RECORD NUMBER:  [id number]    The patient is being assessed for  Transfer to New Unit    I agree that at least one RN has performed a thorough Head to Toe Skin Assessment on the patient. ALL assessment sites listed below have been assessed.      Areas assessed by both nurses:    Head, Face, Ears, Shoulders, Back, Chest, Arms, Elbows, Hands, Sacrum. Buttock, Coccyx, Ischium, Legs. Feet and Heels, and Under Medical Devices         Does the Patient have a Wound? No noted wound(s)       Ranjith Prevention initiated by RN: Yes  Wound Care Orders initiated by RN: No    Pressure Injury (Stage 3,4, Unstageable, DTI, NWPT, and Complex wounds) if present, place Wound referral order by RN under : No    New Ostomies, if present place, Ostomy referral order under : No     Nurse 1 eSignature: Electronically signed by Sincere Sommer RN on 5/22/25 at 11:33 AM EDT    **SHARE this note so that the co-signing nurse can place an eSignature**    Nurse 2 eSignature: Electronically signed by Tressa Tolbert RN on 5/22/25 at 11:35 AM EDT    Is This A New Presentation, Or A Follow-Up?: Skin Lesion What Type Of Note Output Would You Prefer (Optional)?: Standard Output How Severe Is Your Skin Lesion?: mild Has Your Skin Lesion Been Treated?: not been treated

## 2025-05-22 NOTE — PROGRESS NOTES
Facility/Department: [address] CVICU   DYSPHAGIA THERAPY and SPEECH / LANGUAGE / COGNITIVE-LINGUISTIC TREATMENT    Patient: Cassia Morris   : 1970   MRN: [id number]      Treatment Date: 2025      Admitting Dx:   CVA (cerebrovascular accident due to intracerebral hemorrhage) (HCC) [I61.9]  Elevated LFTs [R79.89]  Hypertensive urgency [I16.0]  Stroke-like symptom [R29.90]   has a past medical history of Hepatitis B, Hernia, abdominal, and Unspecified cerebral artery occlusion with cerebral infarction.   has no past surgical history on file.  Allergies: medication allergies noted  Dysphagia History including instrumental assessment: no SLP notes on record  GI history: initial GI visit (2024) d/t new dysphagia complaint (heartburn x 3 months with some dysphagia (mid swallow, liquids/solids), Intermittent chest discomfort x 8 months); 10/11/2024 EUS/EGD: \"No endoscopic abnormality was evident in the esophagus to explain the patient's complaint of dysphagia. Biopsies were obtained from the proximal and distal esophagus with cold forceps for histology of suspected eosinophilic esophagitis. Diffuse mild inflammation characterized by congestion (edema) and erythema was found in the gastric antrum. Random biopsies were obtained in the gastric body, at the incisura and in the gastric antrum with cold forceps for histology. The duodenal bulb and second portion of the duodenum were normal.\" Most recently seen 2025: \"Still has daily nausea with vomiting 2-3 days week (multiple times/day, non bloody, sometimes bilious). Ondansetron not as helpful, promethazine is but sedating. Eating less, wt stable. Dysphagia still same (solids, liquids, pills), no odynophagia. Heartburn better.\" A/P notes with potential need for repeat scope, concern for metformin contributing to GI sx, rec to continue ondansetron and promethazine (with caution), avoid NSAIDs, H pylori eradication test on hold d/t PPI  Baseline/Prior Level of  Function: Living Status: admitted from home with wife; Baseline diet: regular/thin                      Onset: 2025     SLP Diagnosis: Dysarthria, Cognitive-Linguistic  Dysphagia Diagnosis: Oropharyngeal dysphagia      CURRENT ENCOUNTER DIAGNOSTICS/COURSE OF ADMISSION     2025 admitted with c/o tachycardia. MD ADMISSION H&P HPI: \"54m with history of prior history of CVA, continued tobacco abuse, presents to the ER with L facial numbness and weakness. Patient states he had episode of weakness 2 nights ago, which resolved only to recur this evening around 7pm prompting ER consultation. Facial weakness has been associated with difficulty swallowing. Patient has also had chest pain, left sided, intermittent for several days as well. Currently he is seen and examined on room air, in no respiratory distress, awake, alert, oriented.\"   Consults noted: Neuro, Cardioloyg  2025 AM rapid response: diaphoresis, shaking; unclear etiology - transferred to CVICU and undergoing work-up     Most Recent Imagin2025 CXR: IMPRESSION: No acute cardiopulmonary process.  2025 CT Abdomen/Pelvis: IMPRESSION: 1. No acute inflammatory or obstructive process seen in the abdomen or pelvis. 2. Dilated extrahepatic bile duct without obstructing cause. Recommend correlation liver function test.  2025 CT Head: IMPRESSION: No acute intracranial abnormality.  2025 CTA Head/Neck: IMPRESSION: No large vessel occlusion in the head or neck.   2025 CXR: IMPRESSION: No acute cardiopulmonary findings   2025 MRI Brain: IMPRESSION: No acute intracranial abnormality. Old infarctions in the right occipital lobe, posteromedial right temporal lobe, right thalamus and bilateral cerebellar hemispheres. Mild chronic microvascular disease.    Current Diet Level: NPO, MD request for SLP re-assess s/p rapid response prior to initiating 2025 AM recommended diet    Respiratory Status: Room Air     Reason for initial

## 2025-05-22 NOTE — PROGRESS NOTES
[facility]  Personalized Stroke Treatment Plan  My Stroke Type:   [] Ischemic Stroke (Blockage of blood flow to the brain)     [] TIA - Transient Ischemic Attack (mini-stroke)    Personal risk factors you can control include:    [] Alcohol Abuse: check with your physician before any alcohol consumption.   [] Atrial fibrillation: may cause blood clots.  [] Drug Abuse: Seek help, talk with your doctor  [] Clotting Disorder  [x] Diabetes  [] Family history of stroke or heart disease  [x] High Blood Pressure/Hypertension: work with your physician.  [x] High cholesterol: monitor cholesterol levels with your physician.   [x] Overweight/Obesity: work with your physician for your ideal body weight.  [] Physical Inactivity: get regular exercise as directed by your physician.   [] Personal history of previous TIA or stroke  [] Poor Diet; decrease salt (sodium) in your diet, follow diet directed by physician.   [x] Smoking: Cigarette/Cigar: stop smoking.     Follow up with your physician is important after discharge.    TAKE all medications as prescribed. Do not stop taking any medications   without talking to your physician.    BE FAST is a simple way to remember the main symptoms of stroke. Recognizing these symptoms helps you know when to call for medical help.  BE FAST stands for:                                                 B Balance problems                                                 E Eyes, vision lost        F  Face Drooping      A  Arm Weakness        S  Speech Difficulty      T  Time to Call 9-1-1  DO NOT DELAY THIS!    Educated patient and/or family on personal risk factors for stroke/TIA.  Included ways to reduce the risk for recurrent stroke.    [facility]'s Stroke treatment and prevention, Managing your recovery  notebook  provided to patient.  The notebook includes, but not limited to, sections addressing warning signs & symptoms of a stroke. The need to call EMS (911) immediately if signs &

## 2025-05-22 NOTE — H&P
HISTORY AND PHYSICAL             Date: 5/22/2025        Patient Name: Cassia Morris     YOB: 1970      Age:  54 y.o.    Chief Complaint     Chief Complaint   Patient presents with    Tachycardia     Pt states that he has a history of a stroke. Pt states that he has residual left sided weakness, left sided numbness in face, arm, leg,foot,and memory loss. Pt states that he feels different. Pt states symptoms started 2 hours ago.           History Obtained From   Patient and wife    History of Present Illness   54m with history of prior history of CVA, continued tobacco abuse, presents to the ER with L facial numbness and weakness. Patient states he had episode of weakness 2 nights ago, which resolved only to recur this evening around 7pm prompting ER consultation. Facial weakness has been associated with difficulty swallowing. Patient has also had chest pain, left sided, intermittent for several days as well. Currently he is seen and examined on room air, in no respiratory distress, awake, alert, oriented.     Past Medical History     Past Medical History:   Diagnosis Date    Hepatitis B     Hernia, abdominal     Unspecified cerebral artery occlusion with cerebral infarction         Past Surgical History   History reviewed. No pertinent surgical history.     Medications Prior to Admission     Prior to Admission medications    Medication Sig Start Date End Date Taking? Authorizing Provider   vitamin D (CHOLECALCIFEROL) 25 MCG (1000 UT) TABS tablet Take 2 tablets by mouth daily   Yes Richard Munson MD   hydroCHLOROthiazide 12.5 MG capsule Take 1 capsule by mouth daily   Yes Richard Munson MD   pregabalin (LYRICA) 50 MG capsule Take 1 capsule by mouth 3 times daily. Max Daily Amount: 150 mg   Yes Richard Munson MD   tenofovir disoproxil fumarate (VIREAD) 300 MG tablet Take 1 tablet by mouth daily   Yes Richard Munson MD   Multiple Vitamins-Minerals (THERAPEUTIC  MULTIVITAMIN-MINERALS) tablet Take 1 tablet by mouth daily   Yes Provider, Historical, MD   metFORMIN (GLUCOPHAGE-XR) 500 MG extended release tablet Take 1 tablet by mouth daily (with breakfast)    Provider, Richard, MD        Allergies   Pcn [penicillins]    Social History     Social History       Tobacco History       Smoking Status  Every Day Current Packs/Day  0.5 packs/day Smoking Tobacco Type  Cigarettes   Pack Year History     Packs/Day From To Years    0.5   0.0      Smokeless Tobacco Use  Never              Alcohol History       Alcohol Use Status  Never              Drug Use       Drug Use Status  Never              Sexual Activity       Sexually Active  Not Asked                    Family History   History reviewed. No pertinent family history.    Review of Systems   Review of Systems    Physical Exam   BP (!) 169/90   Pulse 88   Temp 98.9 °F (37.2 °C) (Oral)   Resp 18   Ht 1.651 m (5' 5\")   Wt 73.7 kg (162 lb 7.7 oz)   SpO2 100%   BMI 27.04 kg/m²     Physical Exam  Constitutional:       General: He is not in acute distress.     Appearance: Normal appearance. He is not ill-appearing, toxic-appearing or diaphoretic.   HENT:      Mouth/Throat:      Mouth: Mucous membranes are moist.   Eyes:      Extraocular Movements: Extraocular movements intact.      Conjunctiva/sclera: Conjunctivae normal.   Cardiovascular:      Rate and Rhythm: Normal rate and regular rhythm.   Pulmonary:      Effort: Pulmonary effort is normal. No respiratory distress.      Breath sounds: No wheezing, rhonchi or rales.   Abdominal:      General: There is no distension.      Palpations: Abdomen is soft.      Tenderness: There is no abdominal tenderness.   Musculoskeletal:         General: No swelling or tenderness.      Cervical back: Normal range of motion. No rigidity.      Right lower leg: No edema.      Left lower leg: No edema.   Neurological:      Mental Status: He is alert and oriented to person, place, and time.

## 2025-05-22 NOTE — PROGRESS NOTES
SLP NOTE    Rapid response called at conclusion of SLP assessment. Patient with medical status change/decline prompting transfer to CVICU. Orders to resume ST will be needed; please place order when/if medically appropriate.    Thank you.  Shirley Nunes MA, CCC-SLP, #[id number]  Speech-Language Pathologist  Portable phone: [phone]

## 2025-05-22 NOTE — PROGRESS NOTES
Occupational Therapy  Cassia [facility]  [id number]  [id number]    OT orders received, chart reviewed. Rapid Response called d/t inc shakiness and profuse sweating. Tx to CVICU. OT orders cancelled, will need new orders to reinitiate therapy.     Electronically signed by Caryl Cabral OT on 5/22/25 at 9:56 AM EDT

## 2025-05-22 NOTE — PROGRESS NOTES
NAME:  Cassia Morris  YOB: 1970  MEDICAL RECORD NUMBER:  [id number]    Shift Summary: patient admitted from floor due to HTN, diaphoresis, seizure like activity. MRI (-), echo EF: 60-65%. BP normalized. Lactic dropped. Trop elevated and patient experienced chest pain: 12 lead showed NSR- cards will handle outpatient. 2L fluid bolus. U/O WDL. A+O x4.    Family updated: Yes:  bedside    Rhythm: Normal Sinus Rhythm     Most recent vitals:   Visit Vitals  BP (!) 155/127   Pulse 88   Temp 99.3 °F (37.4 °C) (Oral)   Resp 15   Ht 1.651 m (5' 5\")   Wt 69.4 kg (153 lb)   SpO2 100%   BMI 25.46 kg/m²           No data found.    No data found.      Respiratory support needed (if any):  - RA    Admission weight Weight - Scale: 76.4 kg (168 lb 6.9 oz) (05/21/25 2117)    Today's weight    Wt Readings from Last 1 Encounters:   05/22/25 69.4 kg (153 lb)        Estevez need assessed each shift: N/A - no estevez present  UOP >30ml/hr: YES  Last documented BM (in last 48 hrs):  No data found.             Restraints (in use currently or dc'd in last 12 hrs): No    Order current and documentation up to date? No    Lines/Drains reviewed @ bedside.  Peripheral IV 05/21/25 Left Antecubital (Active)   Number of days: 0       Peripheral IV 05/21/25 Right Hand (Active)   Number of days: 0         Drip rates at handoff:    sodium chloride      dextrose         Lab Data:   CBC:   Recent Labs     05/21/25 2054 05/22/25  1002   WBC 8.6 8.9   HGB 14.0 14.4   HCT 41.5 44.0   MCV 87.8 87.9    275     BMP:    Recent Labs     05/21/25 2054 05/22/25  1002    142   K 4.1 3.6   CO2 27 27   BUN 8 8   CREATININE 0.9 0.8*     ABG: No results for input(s): \"PHART\", \"VGV7QGB\", \"PO2ART\" in the last 72 hours.    Any consults during the shift? Yes: Consults received: : neuro, cards    Any signed and held orders to be released?  No        4 Eyes Skin Assessment       The patient is being assessed for  Shift Handoff    I agree that at least

## 2025-05-22 NOTE — CONSULTS
Cardiovascular Consultation     Attending Physician: Jono Grijalva MD    PATIENT: Cassia Morris  : 1970  MRN: [id number]    Reason for Consultation:   Chief Complaint   Patient presents with    Tachycardia     Pt states that he has a history of a stroke. Pt states that he has residual left sided weakness, left sided numbness in face, arm, leg,foot,and memory loss. Pt states that he feels different. Pt states symptoms started 2 hours ago.      History of present illness:  Mr. Cassia Morris is a 54 y.o. male patient with a history of CVA, presented with concerns for left-sided weakness and numbness \"that felt different\".  Patient is being followed and worked up by Neurology.  He reported chest pain in hospital at rest and describes it \"was squeezing\".  He is a limited historian and somnolent.  Family member is at bedside and unable to provide additional descriptors.  He denies pain at the time of the visit.  Denies previous cardiac history or procedures. No associates symptoms.     Medical History:      Diagnosis Date    Hepatitis B     Hernia, abdominal     Unspecified cerebral artery occlusion with cerebral infarction      Surgical History:  History reviewed. No pertinent surgical history.    Social History:  Social History     Socioeconomic History    Marital status:      Spouse name: Not on file    Number of children: Not on file    Years of education: Not on file    Highest education level: Not on file   Occupational History    Not on file   Tobacco Use    Smoking status: Every Day     Current packs/day: 0.50     Types: Cigarettes    Smokeless tobacco: Never   Substance and Sexual Activity    Alcohol use: Never    Drug use: Never    Sexual activity: Not on file   Other Topics Concern    Not on file   Social History Narrative    Not on file     Social Drivers of Health     Financial Resource Strain: Not on File (10/26/2022)    Received from TESHA PARKINSON    Financial Resource Strain

## 2025-05-22 NOTE — ED TRIAGE NOTES
Pt states that he has a history of a stroke. Pt states that he has residual left sided weakness, left sided numbness in face, arm, leg,foot,and memory loss. Pt states that he feels different. Pt states symptoms started 2 hours ago.

## 2025-05-22 NOTE — PROGRESS NOTES
Physical Therapy    [facility]  [id number]  [id number]    Pt transferred to CVICU, will need new orders to resume therapy when medically able to participate  Electronically signed by SANTO HALL, PT on 5/22/2025 at 10:33 AM

## 2025-05-22 NOTE — PROGRESS NOTES
Brief Neurology note:     Attempted to see patient for neurology consultation. Rapid response called at time of encounter with diaphoresis, increased shakiness behaviors.     Rapid response team at bedside    Neurology will reattempt at later time.     Please call with any immediate questions or concerns in the meantime.       Hortencia Ho, CNP  Neurology.

## 2025-05-22 NOTE — CODE DOCUMENTATION
Rapid response called due to increased shakiness and profuse sweating. Dr Henning in room. Stat EKG and troponin ordered. 0.5 mg ativan ordered.

## 2025-05-22 NOTE — PROGRESS NOTES
V2.0    [facility] [place] Progress Note      Name:  Cassia Morris /Age/Sex: 1970  (54 y.o. male)   MRN & CSN:  [id number] & [id number] Encounter Date/Time: 2025 9:49 AM EDT   Location:  [address]- PCP: Yon Arellano MD     Attending:Jono Grijalva MD       Hospital Day: 2    Assessment and Recommendations   Cassia Morris is a 54 y.o. male who presents with CVA (cerebrovascular accident due to intracerebral hemorrhage) (HCC)      Plan:   Shivering, no obvious source of infection at this time started empirically on cefepime, molecular panel for viral aspect ordered chest x-ray repeated with no acute cardiopulmonary findings  Generalized weakness with muscle aches and shakiness could be viral etiology, molecular panel ordered and pending  Chest pain along with minimal increase in troponin cardiology consulted EKG noted no ST elevation  Elevated liver function test patient with hep B, continue home regimen repeat in a.m.  Left facial numbness and weakness, neurology consulted reported as resolved and reoccurred telemetry monitoring neurochecks.  Elevated lactic acid not related to infection likely related to shivering will repeat carbon dioxide 27 anion gap 13 no leukocytosis no fever  Uncontrolled hypertension, home medications monitor improved some blood pressure 146/91 avoid aggressive approach to avoid hypotension    Total critical care time including but not limited to ordering and following on critical labs ordering critical IV medication the patient with potential life-threatening complications, not including any procedure, discussion with subspecialties, time is cumulative total of 32 minutes  Diet Diet NPO   DVT Prophylaxis [] Lovenox, []  Heparin, [] SCDs, [] Ambulation,  [] Eliquis, [] Xarelto  [] Coumadin   Code Status Full Code             Personally reviewed Lab Studies and Imaging       EKG interpreted personally and results heart rate 84 QTc 413 no ST elevation    Imaging that was interpreted  Automated exposure control, iterative reconstruction, and/or weight based adjustment of the mA/kV was utilized to reduce the radiation dose to as low as reasonably achievable. COMPARISON: None. HISTORY: ORDERING SYSTEM PROVIDED HISTORY: Stroke Symptoms TECHNOLOGIST PROVIDED HISTORY: Has a \"code stroke\" or \"stroke alert\" been called?->Yes Reason for exam:->Stroke Symptoms Decision Support Exception - unselect if not a suspected or confirmed emergency medical condition->Emergency Medical Condition (MA) Reason for Exam: Stroke Symptoms Additional signs and symptoms: rapid heart rate,numbness to face on left side Relevant Medical/Surgical History: Current every day smoker. Hx of diabetes, hepatitis, HTN, & unspecified cerebral artery occlusion with cerebral infarction. No relevant surgical or cancer hx. FINDINGS: CTA NECK: AORTIC ARCH/ARCH VESSELS: No dissection or arterial injury.  No significant stenosis of the brachiocephalic or subclavian arteries. CAROTID ARTERIES: No dissection, arterial injury, or hemodynamically significant stenosis by NASCET criteria. VERTEBRAL ARTERIES: No dissection, arterial injury, or significant stenosis. SOFT TISSUES: There is dependent atelectasis in the bilateral posterior lungs.  There is ground-glass attenuation at the lung apices, likely related to underdistention.  No cervical or superior mediastinal lymphadenopathy. The larynx and pharynx are unremarkable.  No acute abnormality of the salivary and thyroid glands. BONES: No acute osseous abnormality. CTA HEAD: ANTERIOR CIRCULATION: No significant stenosis of the intracranial internal carotid, anterior cerebral, or middle cerebral arteries. No aneurysm. POSTERIOR CIRCULATION: No significant stenosis of the basilar or posterior cerebral arteries. No aneurysm. OTHER: No dural venous sinus thrombosis on this non-dedicated study. BRAIN: No mass effect or midline shift. No extra-axial fluid collection. The gray-white differentiation is

## 2025-05-22 NOTE — SIGNIFICANT EVENT
Name: Cassia Morris            Admitted: 5/21/2025     Significant event: Rapid response    Called to bedside because shaking episode and diaphoresis.  Patient sitting in a chair and sweating profusely with episodes of shivering.  Does not look like seizures based on clinical picture.  Temperature 99.  Blood pressure 155/103.  Heart rate 94.  Patient reported having some episode of chest discomfort intermittently during the episode.  Shaking episodes waxing and waning in intensity.  Received Ativan with some resolution and transferred to the ICU    BP (!) 164/90   Pulse 87   Temp 99.4 °F (37.4 °C) (Oral)   Resp 23   Ht 1.651 m (5' 5\")   Wt 69.5 kg (153 lb 3.5 oz)   SpO2 98%   BMI 25.50 kg/m²   General appearance: acute distress due to episodes of shaking  Lungs: clear to auscultation bilaterally  Heart: regular rate and rhythm, S1, S2 normal, no murmur, click, rub or gallop  Abdomen: soft, non-tender; bowel sounds normal; no masses,  no organomegaly  Neurologic: Mental status: Alert, oriented, thought content appropriate    A/P   Shaking episodes : Unclear etiology, appears to be shivering , no active source of infection for now.  Received Ativan 0.5 Mg twice with some resolution.  Discussed with neurology NP at bedside appears less likely to be seizures.  EKG done with some aberrations but without any ischemic changes.  UDS ordered.  CBC BMP lactic acid ordered.  Transferred to ICU for close monitoring    Orders: See above    Code:Full Code  Disposition:    Total critical care time was 40 minutes, excluding separately reportable procedures. Services included in critical care time were chart data review, documentation time, obtaining information from patient, review of nursing notes, and vital sign assessment. There was a high probability of clinically significant life-threatening deterioration in the patient's condition, which required  my urgent intervention.    Olivia Henning MD   5/22/2025  11:40 AM

## 2025-05-22 NOTE — PROGRESS NOTES
Admitted patient to room 5278 from the emergency department. Oriented to room, call light, tv, phone and dietary services. Respirations easy unlabored, denies pain. Bed in lowest position and locked. Exit alarms in place. Non slip socks on. ID bracelet on and correct per patient verbally reporting name and date of birth. Call light and needed items in reach.

## 2025-05-22 NOTE — CONSULTS
Neurology Consult Note  Reason for Consult: TIA/Stroke work up     Chief complaint: \"I wasn't feeling well\"    Jono Mai MD asked me to see Cassia Morris in consultation for evaluation of TIA/Stroke work up     History of Present Illness:  I obtained my information via the patient who is a difficult historian; wife, supplemented with chart review.     Cassia Morris is a 54 y.o. male with hx of prior stroke with residual left sided weakness. He was admitted on 5/21/25  after presenting to the ED with complaints of chest palpitations, chest pain, sensory symptoms and weakness of the left side of his body.     Symptoms per chart started 2 hours prior to presentation though per encounter some variation of symptoms have been going on for days and are intermittent. Symptoms were getting worse so he came to the ED.     He reports he was having new left sided numbness and tingling of his face and limb that he had never had before and felt like his baseline weakness was weaker than normal. He had called his wife and told her he wanted to go to the hospital so she brought him. He also in the last few days has developed atypical shaking that involves both sides of his body with preserved mentation that wife attributes to him being cold. The patient cannot control it. He did also report chest pain.     Wife tells me that he has a lot of anxiety and patient endorses depression. He states there has been a recent event but does not want to discuss at this time. Wife reports he has made atypical bizarre statements-  \" he told me he was trying to get his soul back into his body\".     Admitting diagnosis include \"Stroke like symptoms\" given his left sided complaints; hypertensive urgency (206/106.) and elevated LFT's.  My understanding is that his cardiac work up has been quite unrevealing.     At initial attemp to visit patient a rapid response was called when he was working with SLP. He had seemed non specifically acutely  history.    Social History     Tobacco Use   Smoking Status Every Day    Current packs/day: 0.50    Types: Cigarettes   Smokeless Tobacco Never     Social History     Substance and Sexual Activity   Drug Use Never     Social History     Substance and Sexual Activity   Alcohol Use Never       Exam:  Vitals:    05/22/25 0215 05/22/25 0507 05/22/25 0732 05/22/25 0846   BP: (!) 169/90  (!) 149/89    Pulse: 88  71    Resp: 18 16 14    Temp: 98.9 °F (37.2 °C) 98.6 °F (37 °C) 98.3 °F (36.8 °C)    TempSrc: Oral Oral Oral    SpO2:  100% 100% 99%   Weight: 73.7 kg (162 lb 7.7 oz)      Height: 1.651 m (5' 5\")         Constitutional    Vital signs: BP, HR, and RR reviewed   General Alert, no distress, well-nourished  Psychiatric: cooperative with examination  Neurologic  Mental status:   orientation to person, place, and situation   Memory: identifies wife by name   Attention intact as able to attend well to the exam     Language fluent in conversation. Foreign accent. Can be difficult to understand at times. Sometimes some non sensical responses. .    Comprehension ; follows simple commands  Cranial nerves:   CN2: Visual Fields full w/o extinction on confrontational testing with difficult formal testing   CN 3,4,6: extraocular muscles intact, conjugate. No ptosis. No nystagmus.   CN5: V1: V2: V3: intact to LT sensation bilaterally  CN7: upper and lower facial symmetric without dysarthria. Foreign accent.   CN8: hearing grossly intact to conversation.  CN12: tongue midline with protrusion. Able to move tongue side to side.   Strength:   RUE: 5/5  LUE: 4/5  RLE: 4+/5  LLE:4-/5   Deep tendon reflexes:   Technically difficult  Sensory: light touch symmetric in BUE and BLE.   Cerebellar: No gross dysmetria, effort base attempt  Observed intermittent dysynchronous moderate amplitude shaking of various parts of upper body and lower limbs. Resolved with touch. Subjectively can't control.     Labs  Na: 141  K: 4.1  BUN: 8  Creatinine:

## 2025-05-23 PROBLEM — R53.1 WEAKNESS: Status: ACTIVE | Noted: 2025-05-23

## 2025-05-23 LAB
ANION GAP SERPL CALCULATED.3IONS-SCNC: 8 MMOL/L (ref 3–16)
BUN SERPL-MCNC: 7 MG/DL (ref 7–20)
CALCIUM SERPL-MCNC: 8.7 MG/DL (ref 8.3–10.6)
CHLORIDE SERPL-SCNC: 105 MMOL/L (ref 99–110)
CHOLEST SERPL-MCNC: 159 MG/DL (ref 0–199)
CO2 SERPL-SCNC: 24 MMOL/L (ref 21–32)
CREAT SERPL-MCNC: 0.8 MG/DL (ref 0.9–1.3)
DEPRECATED RDW RBC AUTO: 13.6 % (ref 12.4–15.4)
EST. AVERAGE GLUCOSE BLD GHB EST-MCNC: 165.7 MG/DL
GFR SERPLBLD CREATININE-BSD FMLA CKD-EPI: >90 ML/MIN/{1.73_M2}
GLUCOSE BLD-MCNC: 120 MG/DL (ref 70–99)
GLUCOSE BLD-MCNC: 130 MG/DL (ref 70–99)
GLUCOSE BLD-MCNC: 141 MG/DL (ref 70–99)
GLUCOSE BLD-MCNC: 159 MG/DL (ref 70–99)
GLUCOSE SERPL-MCNC: 129 MG/DL (ref 70–99)
HBA1C MFR BLD: 7.4 %
HCT VFR BLD AUTO: 40.7 % (ref 40.5–52.5)
HDLC SERPL-MCNC: 37 MG/DL (ref 40–60)
HGB BLD-MCNC: 13.2 G/DL (ref 13.5–17.5)
LDLC SERPL CALC-MCNC: 100 MG/DL
MCH RBC QN AUTO: 28.8 PG (ref 26–34)
MCHC RBC AUTO-ENTMCNC: 32.5 G/DL (ref 31–36)
MCV RBC AUTO: 88.6 FL (ref 80–100)
PERFORMED ON: ABNORMAL
PLATELET # BLD AUTO: 240 K/UL (ref 135–450)
PMV BLD AUTO: 7.9 FL (ref 5–10.5)
POTASSIUM SERPL-SCNC: 4.1 MMOL/L (ref 3.5–5.1)
RBC # BLD AUTO: 4.59 M/UL (ref 4.2–5.9)
SODIUM SERPL-SCNC: 137 MMOL/L (ref 136–145)
TRIGL SERPL-MCNC: 112 MG/DL (ref 0–150)
VLDLC SERPL CALC-MCNC: 22 MG/DL
WBC # BLD AUTO: 6.1 K/UL (ref 4–11)

## 2025-05-23 PROCEDURE — 85027 COMPLETE CBC AUTOMATED: CPT

## 2025-05-23 PROCEDURE — 6370000000 HC RX 637 (ALT 250 FOR IP): Performed by: HOSPITALIST

## 2025-05-23 PROCEDURE — 97530 THERAPEUTIC ACTIVITIES: CPT

## 2025-05-23 PROCEDURE — 80048 BASIC METABOLIC PNL TOTAL CA: CPT

## 2025-05-23 PROCEDURE — 6360000002 HC RX W HCPCS: Performed by: INTERNAL MEDICINE

## 2025-05-23 PROCEDURE — 97130 THER IVNTJ EA ADDL 15 MIN: CPT

## 2025-05-23 PROCEDURE — 6360000002 HC RX W HCPCS: Performed by: HOSPITALIST

## 2025-05-23 PROCEDURE — 94760 N-INVAS EAR/PLS OXIMETRY 1: CPT

## 2025-05-23 PROCEDURE — 2500000003 HC RX 250 WO HCPCS: Performed by: HOSPITALIST

## 2025-05-23 PROCEDURE — 97166 OT EVAL MOD COMPLEX 45 MIN: CPT

## 2025-05-23 PROCEDURE — 2580000003 HC RX 258: Performed by: INTERNAL MEDICINE

## 2025-05-23 PROCEDURE — 36415 COLL VENOUS BLD VENIPUNCTURE: CPT

## 2025-05-23 PROCEDURE — 2580000003 HC RX 258: Performed by: HOSPITALIST

## 2025-05-23 PROCEDURE — 80061 LIPID PANEL: CPT

## 2025-05-23 PROCEDURE — 2140000000 HC CCU INTERMEDIATE R&B

## 2025-05-23 PROCEDURE — 96366 THER/PROPH/DIAG IV INF ADDON: CPT

## 2025-05-23 PROCEDURE — 83036 HEMOGLOBIN GLYCOSYLATED A1C: CPT

## 2025-05-23 PROCEDURE — 6370000000 HC RX 637 (ALT 250 FOR IP): Performed by: INTERNAL MEDICINE

## 2025-05-23 PROCEDURE — 97129 THER IVNTJ 1ST 15 MIN: CPT

## 2025-05-23 RX ADMIN — CEFEPIME 2000 MG: 2 INJECTION, POWDER, FOR SOLUTION INTRAVENOUS at 18:31

## 2025-05-23 RX ADMIN — ASPIRIN 81 MG: 81 TABLET, CHEWABLE ORAL at 08:23

## 2025-05-23 RX ADMIN — SODIUM CHLORIDE, PRESERVATIVE FREE 10 ML: 5 INJECTION INTRAVENOUS at 20:41

## 2025-05-23 RX ADMIN — SODIUM CHLORIDE: 0.9 INJECTION, SOLUTION INTRAVENOUS at 02:04

## 2025-05-23 RX ADMIN — TENOFOVIR DISPROXIL FUMARATE 300 MG: 300 TABLET ORAL at 08:23

## 2025-05-23 RX ADMIN — ENOXAPARIN SODIUM 40 MG: 100 INJECTION SUBCUTANEOUS at 08:23

## 2025-05-23 RX ADMIN — CEFEPIME 2000 MG: 2 INJECTION, POWDER, FOR SOLUTION INTRAVENOUS at 02:04

## 2025-05-23 RX ADMIN — ROSUVASTATIN CALCIUM 40 MG: 10 TABLET, FILM COATED ORAL at 20:41

## 2025-05-23 RX ADMIN — ONDANSETRON 4 MG: 2 INJECTION, SOLUTION INTRAMUSCULAR; INTRAVENOUS at 14:53

## 2025-05-23 RX ADMIN — CEFEPIME 2000 MG: 2 INJECTION, POWDER, FOR SOLUTION INTRAVENOUS at 09:43

## 2025-05-23 ASSESSMENT — PAIN SCALES - GENERAL: PAINLEVEL_OUTOF10: 7

## 2025-05-23 ASSESSMENT — PAIN DESCRIPTION - LOCATION: LOCATION: ABDOMEN;RIB CAGE

## 2025-05-23 ASSESSMENT — PAIN - FUNCTIONAL ASSESSMENT: PAIN_FUNCTIONAL_ASSESSMENT: ACTIVITIES ARE NOT PREVENTED

## 2025-05-23 ASSESSMENT — PAIN DESCRIPTION - ORIENTATION: ORIENTATION: LEFT

## 2025-05-23 ASSESSMENT — PAIN DESCRIPTION - DESCRIPTORS: DESCRIPTORS: ACHING

## 2025-05-23 NOTE — PROGRESS NOTES
Occupational Therapy      [facility] - Occupational Therapy   Phone: [phone]    Occupational Therapy  Facility/Department:[address] CVICU    [x] Initial Evaluation            [] Daily Treatment Note         [] Discharge Summary      Patient: Cassia Morris   : 1970   MRN: [id number]   Date of Service:  2025    Staff Mobility Recommendation: assist x 1    AM-PAC Score:   Discharge Recommendations: home with family support    Admitting Diagnosis:  CVA (cerebrovascular accident due to intracerebral hemorrhage) (HCC)  Ordering Physician: Dr. Newsome  Current Admission Summary: Per H&P: \"54m with history of prior history of CVA, continued tobacco abuse, presents to the ER with L facial numbness and weakness. Patient states he had episode of weakness 2 nights ago, which resolved only to recur this evening around 7pm prompting ER consultation. Facial weakness has been associated with difficulty swallowing. Patient has also had chest pain, left sided, intermittent for several days as well. \" MRI negative.     Past Medical History:  has a past medical history of Hepatitis B, Hernia, abdominal, and Unspecified cerebral artery occlusion with cerebral infarction.  Past Surgical History:  has no past surgical history on file.    Assessment  Activity Tolerance: Fair  Reports dizziness with activity. BP prior to therapy arrival 145/96.  After ambulating ~ 15 ft in room pt reports dizziness. Pt sat EOB to take BP: 113/81. Standing /80  Impairments Requiring Therapeutic Intervention: decreased functional mobility, decreased ADL status, decreased strength, decreased safety awareness, decreased endurance  Prognosis: good    Clinical Assessment: 53 y/o admitted 2025 with CVA r/o. PTA Pt lives at home with spouse and was ind with ADLs and functional mobility. Today, pt min A for transfers and CGA/min A for functional mobility around room. Pt declined Adls- anticipate will be CGA/min A for full ADL based

## 2025-05-23 NOTE — PLAN OF CARE
Problem: Safety - Adult  Goal: Free from fall injury  5/23/2025 0033 by Jodee Gu RN  Outcome: Progressing     Problem: Chronic Conditions and Co-morbidities  Goal: Patient's chronic conditions and co-morbidity symptoms are monitored and maintained or improved  5/23/2025 0033 by Jodee Gu RN  Outcome: Progressing     Problem: Discharge Planning  Goal: Discharge to home or other facility with appropriate resources  5/23/2025 0033 by Jodee uG RN  Outcome: Progressing     Problem: Pain  Goal: Verbalizes/displays adequate comfort level or baseline comfort level  5/23/2025 0033 by Jodee Gu RN  Outcome: Progressing     Problem: Skin/Tissue Integrity  Goal: Skin integrity remains intact  Description: 1.  Monitor for areas of redness and/or skin breakdown2.  Assess vascular access sites hourly3.  Every 4-6 hours minimum:  Change oxygen saturation probe site4.  Every 4-6 hours:  If on nasal continuous positive airway pressure, respiratory therapy assess nares and determine need for appliance change or resting period  Outcome: Progressing  Flowsheets (Taken 5/23/2025 0033)  Skin Integrity Remains Intact: Monitor for areas of redness and/or skin breakdown     Problem: ABCDS Injury Assessment  Goal: Absence of physical injury  Outcome: Progressing  Flowsheets (Taken 5/23/2025 0033)  Absence of Physical Injury: Implement safety measures based on patient assessment

## 2025-05-23 NOTE — CARE COORDINATION
05/23/25 1219   Service Assessment   Patient Orientation Alert and Oriented   Cognition Alert   History Provided By Patient;Spouse   Primary Caregiver Self   Accompanied By/Relationship wife   Support Systems Spouse/Significant Other   Patient's Healthcare Decision Maker is: Legal Next of Kin   PCP Verified by CM Yes  (Dr Arellano   last month)   Last Visit to PCP Within last 3 months   Prior Functional Level Independent in ADLs/IADLs   Current Functional Level Independent in ADLs/IADLs   Can patient return to prior living arrangement Yes   Ability to make needs known: Good   Family able to assist with home care needs: Yes   Would you like for me to discuss the discharge plan with any other family members/significant others, and if so, who? No   Financial Resources Other (Comment)  ([employer])   Community Resources Other (Comment)  (Alcohol and Mental Health resources)   CM/SW Referral Other (see comment)  (mental health , Positive drug screen)   Discharge Planning   Type of Residence Apartment   Living Arrangements Spouse/Significant Other   Current Services Prior To Admission None   Potential Assistance Needed Durable Medical Equipment;Other (Comment)  (Therapy reports he may need a Rollator)   DME Ordered? No   Potential Assistance Purchasing Medications No   Type of Home Care Services None   Patient expects to be discharged to: Apartment   One/Two Story Residence One story   History of falls? 0   Services At/After Discharge   Transition of Care Consult (CM Consult) N/A   Services At/After Discharge None   [place] Resource Information Provided? No   Mode of Transport at Discharge Other (see comment)  (wife)   Confirm Follow Up Transport Family

## 2025-05-23 NOTE — CARE COORDINATION
Case Management Assessment  Initial Evaluation    Date/Time of Evaluation: 5/23/2025 12:22 PM  Assessment Completed by: MITCHELL Silvestre    If patient is discharged prior to next notation, then this note serves as note for discharge by case management.    Patient Name: Cassia Morris                   YOB: 1970  Diagnosis: CVA (cerebrovascular accident due to intracerebral hemorrhage) (HCC) [I61.9]  Elevated LFTs [R79.89]  Hypertensive urgency [I16.0]  Stroke-like symptom [R29.90]  Weakness [R53.1]                   Date / Time: 5/21/2025  8:40 PM    Patient Admission Status: Inpatient   Readmission Risk (Low < 19, Mod (19-27), High > 27): Readmission Risk Score: 8.4    Current PCP: Yon Arellano MD  PCP verified by CM? Yes (Dr Arellano   last month)    Chart Reviewed: Yes      History Provided by: Patient, Spouse  Patient Orientation: Alert and Oriented    Patient Cognition: Alert    Hospitalization in the last 30 days (Readmission):  No    If yes, Readmission Assessment in  Navigator will be completed.    Advance Directives:      Code Status: Full Code   Patient's Primary Decision Maker is: Legal Next of Kin      Discharge Planning:    Patient lives with: Spouse/Significant Other Type of Home: Apartment  Primary Care Giver: Self  Patient Support Systems include: Spouse/Significant Other   Current Financial resources: Other (Comment) ([employer])  Current community resources: Other (Comment) (Alcohol and Mental Health resources)  Current services prior to admission: None            Current DME:              Type of Home Care services:  None    ADLS  Prior functional level: Independent in ADLs/IADLs  Current functional level: Independent in ADLs/IADLs    PT AM-PAC: 19 /24  OT AM-PAC:   /24    Family can provide assistance at DC: Yes  Would you like Case Management to discuss the discharge plan with any other family members/significant others, and if so, who? No  Plans to Return to Present  MITCHELL Stafford     Case Management   [phone]    5/23/2025  12:23 PM

## 2025-05-23 NOTE — PROGRESS NOTES
V2.0    [facility] Progress Note      Name:  Cassia Morris /Age/Sex: 1970  (54 y.o. male)   MRN & CSN:  [id number] & [id number] Encounter Date/Time: 2025 8:07 AM EDT   Location:  [address] PCP: Yon Arellano MD     Attending:Jono Grijalva MD       Hospital Day: 3    Assessment and Recommendations   Cassia Morris is a 54 y.o. male who presents with CVA (cerebrovascular accident due to intracerebral hemorrhage) (HCC)      Plan:   Shivering, no obvious source of infection at this time started empirically on cefepime, afebrile will continue short course and discontinue procalcitonin 0.05 molecular panel negative  Generalized weakness with muscle aches and shakiness could be viral etiology, molecular panel ordered.  Chest pain along with minimal increase in troponin cardiology consulted EKG noted no ST elevation.  No further chest pain, echo noted with preserved EF no wall motion abnormalities  Elevated liver function test patient with hep B, continue home regimen.  Left facial numbness and weakness, neurology consulted reported as resolved and reoccurred telemetry monitoring neurochecks.  MRI negative for acute finding  Elevated lactic acid not related to infection likely related to shivering improved down to 1.6  Urine drug screen positive for cannabis and fentanyl  Uncontrolled hypertension, home medications monitor improved some blood pressure 146/91 avoid aggressive approach to avoid hypotension        Diet ADULT DIET; Regular; Low Fat/Low Chol/High Fiber/DEREK   DVT Prophylaxis [] Lovenox, []  Heparin, [] SCDs, [] Ambulation,  [] Eliquis, [] Xarelto  [] Coumadin   Code Status Full Code             Personally reviewed Lab Studies and Imaging     Discussed management of the case with cardiology who recommended supportive measures  Rhythm strip independently interpreted by myself heart rate 80 QTc 428        Medical Decision Making:  The following items were considered in medical decision  for Exam: SOB Additional signs and symptoms: rapid heart rate,numbness to face on left side Relevant Medical/Surgical History: Current every day smoker. Hx of diabetes, hepatitis, HTN, & unspecified cerebral artery occlusion with cerebral infarction. No relevant surgical or cancer hx. FINDINGS: Lines and tubes: None The lungs are clear.  Heart size is normal.     No acute cardiopulmonary process.     CTA HEAD NECK W CONTRAST  Result Date: 5/21/2025  EXAMINATION: CTA OF THE HEAD AND NECK WITH CONTRAST 5/21/2025 8:54 pm: TECHNIQUE: CTA of the head and neck was performed with the administration of intravenous contrast. Multiplanar reformatted images are provided for review.  MIP images are provided for review. Stenosis of the internal carotid arteries measured using NASCET criteria. Automated exposure control, iterative reconstruction, and/or weight based adjustment of the mA/kV was utilized to reduce the radiation dose to as low as reasonably achievable. COMPARISON: None. HISTORY: ORDERING SYSTEM PROVIDED HISTORY: Stroke Symptoms TECHNOLOGIST PROVIDED HISTORY: Has a \"code stroke\" or \"stroke alert\" been called?->Yes Reason for exam:->Stroke Symptoms Decision Support Exception - unselect if not a suspected or confirmed emergency medical condition->Emergency Medical Condition (MA) Reason for Exam: Stroke Symptoms Additional signs and symptoms: rapid heart rate,numbness to face on left side Relevant Medical/Surgical History: Current every day smoker. Hx of diabetes, hepatitis, HTN, & unspecified cerebral artery occlusion with cerebral infarction. No relevant surgical or cancer hx. FINDINGS: CTA NECK: AORTIC ARCH/ARCH VESSELS: No dissection or arterial injury.  No significant stenosis of the brachiocephalic or subclavian arteries. CAROTID ARTERIES: No dissection, arterial injury, or hemodynamically significant stenosis by NASCET criteria. VERTEBRAL ARTERIES: No dissection, arterial injury, or significant stenosis. SOFT  TISSUES: There is dependent atelectasis in the bilateral posterior lungs.  There is ground-glass attenuation at the lung apices, likely related to underdistention.  No cervical or superior mediastinal lymphadenopathy. The larynx and pharynx are unremarkable.  No acute abnormality of the salivary and thyroid glands. BONES: No acute osseous abnormality. CTA HEAD: ANTERIOR CIRCULATION: No significant stenosis of the intracranial internal carotid, anterior cerebral, or middle cerebral arteries. No aneurysm. POSTERIOR CIRCULATION: No significant stenosis of the basilar or posterior cerebral arteries. No aneurysm. OTHER: No dural venous sinus thrombosis on this non-dedicated study. BRAIN: No mass effect or midline shift. No extra-axial fluid collection. The gray-white differentiation is maintained.     No large vessel occlusion in the head or neck.     CT HEAD WO CONTRAST  Addendum Date: 5/21/2025  ADDENDUM: Results were reported to Dr. Díaz at 9:30 p.m. on May 21, 2025.     Result Date: 5/21/2025  EXAMINATION: CT OF THE HEAD WITHOUT CONTRAST  5/21/2025 8:51 pm TECHNIQUE: CT of the head was performed without the administration of intravenous contrast. Automated exposure control, iterative reconstruction, and/or weight based adjustment of the mA/kV was utilized to reduce the radiation dose to as low as reasonably achievable. COMPARISON: CT head November 13, 2023 HISTORY: ORDERING SYSTEM PROVIDED HISTORY: Stroke Symptoms TECHNOLOGIST PROVIDED HISTORY: Has a \"code stroke\" or \"stroke alert\" been called?->Yes Reason for exam:->Stroke Symptoms Decision Support Exception - unselect if not a suspected or confirmed emergency medical condition->Emergency Medical Condition (MA) Reason for Exam: Stroke Symptoms Additional signs and symptoms: rapid heart rate,numbness to face on left side Relevant Medical/Surgical History: Current every day smoker.  Hx of hepatitis & unspecified cerebral artery occlusion with cerebral infarction.  No

## 2025-05-23 NOTE — PROGRESS NOTES
SLP NOTE    Treatment/follow-up attempted. Chart reviewed with note for negative work-up thus far. Report for patient back to baseline for dysphagia, dysarthria, and cognitive-language, but patient unable to remain adequate level of alertness for session participation to confirm. ST to re-attempt as schedule permits (potentially at a later date). Should patient be discharge prior to next ST visit and there is med team concern for SLP needs below baseline level, recommend continue ST after discharge.    Thank you.  Shirley Nunes MA, CCC-SLP, #[id number]  Speech-Language Pathologist  Portable phone: [phone]

## 2025-05-23 NOTE — PROGRESS NOTES
NAME:  Cassia Morris  YOB: 1970  MEDICAL RECORD NUMBER:  [id number]    Shift Summary: No shaking episodes overnight.     Family updated: Yes:  wife    Rhythm: Normal Sinus Rhythm     Most recent vitals:   Visit Vitals  /84   Pulse 56   Temp 98.8 °F (37.1 °C) (Oral)   Resp 16   Ht 1.651 m (5' 5\")   Wt 74.3 kg (163 lb 12.8 oz)   SpO2 96%   BMI 27.26 kg/m²           No data found.    No data found.      Respiratory support needed (if any):  - RA    Admission weight Weight - Scale: 76.4 kg (168 lb 6.9 oz) (05/21/25 2117)    Today's weight    Wt Readings from Last 1 Encounters:   05/23/25 74.3 kg (163 lb 12.8 oz)        Estevez need assessed each shift: N/A - no estevez present  UOP >30ml/hr: YES  Last documented BM (in last 48 hrs):  Patient Vitals for the past 48 hrs:   Last BM (including prior to admit)   05/22/25 2000 05/21/25                Restraints (in use currently or dc'd in last 12 hrs): No    Order current and documentation up to date? No    Lines/Drains reviewed @ bedside.  Peripheral IV 05/21/25 Left Antecubital (Active)   Number of days: 1       Peripheral IV 05/21/25 Right Hand (Active)   Number of days: 1         Drip rates at handoff:    sodium chloride Stopped (05/23/25 0204)    dextrose         Lab Data:   CBC:   Recent Labs     05/22/25  1002 05/23/25  0324   WBC 8.9 6.1   HGB 14.4 13.2*   HCT 44.0 40.7   MCV 87.9 88.6    240     BMP:    Recent Labs     05/22/25  1002 05/23/25  0324    137   K 3.6 4.1   CO2 27 24   BUN 8 7   CREATININE 0.8* 0.8*     ABG: No results for input(s): \"PHART\", \"[id number]\", \"PO2ART\" in the last 72 hours.    Any consults during the shift? No    Any signed and held orders to be released?  No        4 Eyes Skin Assessment       The patient is being assessed for  Shift Handoff    I agree that at least one RN has performed a thorough Head to Toe Skin Assessment on the patient. ALL assessment sites listed below have been assessed.      Areas assessed by  both nurses: Head, Face, Ears, Shoulders, Back, Chest, Arms, Elbows, Hands, Sacrum. Buttock, Coccyx, Ischium, Legs. Feet and Heels, and Under Medical Devices         Does the Patient have a Wound? No noted wound(s)    Wound Care Orders initiated or active by RN: No       Ranjith Prevention initiated or active: No    Pressure Injury (Stage 3,4, Unstageable, DTI, NWPT, and Complex wounds): No  If present, place \"IP Wound Care/Ostomy Nurse Eval and Treat\" in : No    Ostomies present: No  If new Ostomy, place \"IP Wound Care/Ostomy Nurse Eval and Treat\" in : No     Nurse 1 eSignature: Electronically signed by Jodee Gu RN on 5/23/25 at 6:15 AM EDT    **SHARE this note so that the co-signing nurse can place an eSignature**    Nurse 2 eSignature: Electronically signed by Riddhi Norwood RN on 5/23/25 at 7:11 AM EDT

## 2025-05-23 NOTE — PROGRESS NOTES
Physical Therapy    [facility] - Physical Therapy   Phone: [phone]    Physical Therapy  Facility/Department:[address] PROGRESSIVE CARE    [] Initial Evaluation            [x] Daily Treatment Note         [] Discharge Summary      Patient: Cassia Morris   : 1970   MRN: [id number]   Date of Service:  2025  Staff Mobility Recommendation: CGA x 1 with use of gait belt    AM-PAC score: 18/24  Discharge Recommendations: Initial 24hr c HHPT  Cassia Morris scored a 18/24 on the AM-PAC short mobility form. Current research shows that an AM-PAC score of 18 or greater is typically associated with a discharge to the patient's home setting. Based on the patient's AM-PAC score and their current functional mobility deficits, it is recommended that the patient have 2-3 sessions per week of Physical Therapy at d/c to increase the patient's independence.  At this time, this patient demonstrates the endurance and safety to discharge home with Home PT and a follow up treatment frequency of 2-3x/wk.  Please see assessment section for further patient specific details.    If patient discharges prior to next session this note will serve as a discharge summary.  Please see below for the latest assessment towards goals.       Admitting Diagnosis: CVA (cerebrovascular accident due to intracerebral hemorrhage) (HCC)  Ordering Physician: Dr Grijalva  Current Admission Summary: 54m with history of prior history of CVA, continued tobacco abuse, presents to the ER with L facial numbness and weakness. Pt then transferred to CVU due to shakiness/diaphoretic on 25.    Past Medical History:  has a past medical history of Hepatitis B, Hernia, abdominal, and Unspecified cerebral artery occlusion with cerebral infarction.  Past Surgical History:  has no past surgical history on file.    Assessment  Activity Tolerance: Fair - Reports dizziness with activity. BP prior to therapy arrival 145/96. After ambulating ~ 15 ft in room pt reports  Discharge: available PRN    Examination   Vision/Hearing  Vision  Vision: Impaired  Vision Exceptions: Wears glasses for reading  Hearing  Hearing: Within functional limits (sometimes needs tv turned up loud)    Observation:     Posture:   Fair  Sensation:   Numbness and tingling on L side including his face/neck  Coordination Testing:   Not formally tested but appears slightly limited due to overall weakness in LLE     ROM:   (B) LE AROM WFL  Strength:   (L) LE: grossly 2+ to 3+ throughout  (R) LE: WFL  Therapist Clinical Decision Making (Complexity): medium complexity  Clinical Presentation: evolving      Functional Mobility  Bed Mobility:  Supine to Sit: stand by assistance  Sit to Supine: n/a, in recliner at end of session    Transfers:  Sit to stand transfer: contact guard assistance  Stand to sit transfer: contact guard assistance    Ambulation:  Surface:level surface  Assistive Device: no device  Assistance: contact guard assistance, minimal assistance  Distance: 15' x 2 trials  Gait Mechanics: Slow emir  Deviated path  Increased lateral sway  Comments: mildly unsteady at times   Balance:  Static Sitting Balance: good: independent with functional balance in unsupported position  Dynamic Sitting Balance: good: independent with functional balance in unsupported position  Static Standing Balance: fair: maintains balance at CGA without use of UE support  Dynamic Standing Balance: poor (+): requires min (A) to maintain balance    Exercise:   No exercises performed this session.       Cognition  Overall Cognitive Status: Impaired  Arousal/Alertness: appropriate responses to stimuli  Following Commands: follows one step commands consistently  Memory: decreased recall of recent events  Safety Judgement: decreased awareness of need for safety  Insights: fully aware of deficits pt knows he is more unsteady than his normal self  Orientation:    oriented to person, oriented to place, oriented to situation, and

## 2025-05-23 NOTE — PROGRESS NOTES
NAME:  Cassia Morris  YOB: 1970  MEDICAL RECORD NUMBER:  [id number]    Shift Summary:  1 Episode of emesis today. Joe cardic for a while, Md notified. Up to chair with PT/OT. PCU downgrade    Family updated: Yes:  wife    Rhythm: Normal Sinus Rhythm     Most recent vitals:   Visit Vitals  BP (!) 141/92   Pulse 60   Temp 99.2 °F (37.3 °C) (Oral)   Resp 13   Ht 1.651 m (5' 5\")   Wt 74.3 kg (163 lb 12.8 oz)   SpO2 96%   BMI 27.26 kg/m²           No data found.    No data found.      Respiratory support needed (if any):  - RA    Admission weight Weight - Scale: 76.4 kg (168 lb 6.9 oz) (05/21/25 2117)    Today's weight    Wt Readings from Last 1 Encounters:   05/23/25 74.3 kg (163 lb 12.8 oz)        Estevez need assessed each shift: N/A - no estevez present  UOP >30ml/hr: YES  Last documented BM (in last 48 hrs):  Patient Vitals for the past 48 hrs:   Last BM (including prior to admit)   05/22/25 2000 05/21/25                Restraints (in use currently or dc'd in last 12 hrs): No    Order current and documentation up to date? No    Lines/Drains reviewed @ bedside.  Peripheral IV 05/21/25 Left Antecubital (Active)   Number of days: 1       Peripheral IV 05/21/25 Right Hand (Active)   Number of days: 1         Drip rates at handoff:    sodium chloride 5 mL/hr at 05/23/25 1426    dextrose         Lab Data:   CBC:   Recent Labs     05/22/25  1002 05/23/25  0324   WBC 8.9 6.1   HGB 14.4 13.2*   HCT 44.0 40.7   MCV 87.9 88.6    240     BMP:    Recent Labs     05/22/25  1002 05/23/25  0324    137   K 3.6 4.1   CO2 27 24   BUN 8 7   CREATININE 0.8* 0.8*     ABG: No results for input(s): \"PHART\", \"PIH1MCO\", \"PO2ART\" in the last 72 hours.    Any consults during the shift? No    Any signed and held orders to be released?  No        4 Eyes Skin Assessment       The patient is being assessed for  Shift Handoff    I agree that at least one RN has performed a thorough Head to Toe Skin Assessment on the patient.

## 2025-05-23 NOTE — CARE COORDINATION
SW order rec'd.  Chart Reviewed.  Entered room while therapy was completing their session and getting him comfortable in the recliner.  PT reports they may want him to have a rollator but too soon to determine this.  OT reported they found his underwear and wallet in the bed and placed it on the table near the sink.  Lady in room (confirmed to be his wife) states she will take the wallet.    Met with pt and wife to introduce  role and to initiate discussion regarding DC planning.    They live together, apt with 2 + 3 steps entry.  He does not work as he is working on paperwork to be able to work in the [place]. He is totally independent at home with all aspects of his care without use of DME.    Asked about substance use as chart reflects Urine sample indicates fentanyl and marijuana.  He reports he uses the marijuana to help him increase his food intake and it calms him.  Asked if there was something going on that he is needing to be calmed.  Wife reports, \"I think he has schizophrenia.\"  When asked how she came up with this, she reports he has become more and more paranoid.  Asked if he had been diagnosed with any mental health issues. He denied.  Asked if there was something going on that is causing him distress and he started to tear up and stated he didn't want to speak about it.  Education given that sometimes, saying things out loud and having another person hear him is beneficial.   Offered a listening ear but again did not wish to speak about it.   Wife reports, \"He won't go to a mental health person.\"    They gave no reason.    Did offer resources for substance use and Mental Health resources in the event he wishes to change his mind.  Education given about dealing with what is causing pain for him vs using a substance/behavior to try to stop the pain might be a better solution for him.    No concerns for dc at this time.    MITCHELL Murphy     Case Management   [phone]    5/23/2025  12:17

## 2025-05-23 NOTE — PROGRESS NOTES
Facility/Department: [address] CVICU   DYSPHAGIA THERAPY and SPEECH / LANGUAGE / COGNITIVE-LINGUISTIC TREATMENT    Patient: Cassia Morris   : 1970   MRN: [id number]      Treatment Date: 2025      Admitting Dx:   CVA (cerebrovascular accident due to intracerebral hemorrhage) (HCC) [I61.9]  Elevated LFTs [R79.89]  Hypertensive urgency [I16.0]  Stroke-like symptom [R29.90]  Weakness [R53.1]   has a past medical history of Hepatitis B, Hernia, abdominal, and Unspecified cerebral artery occlusion with cerebral infarction.   has no past surgical history on file.  Allergies: medication allergies noted  Dysphagia History including instrumental assessment: no SLP notes on record  GI history: initial GI visit (2024) d/t new dysphagia complaint (heartburn x 3 months with some dysphagia (mid swallow, liquids/solids), Intermittent chest discomfort x 8 months); 10/11/2024 EUS/EGD: \"No endoscopic abnormality was evident in the esophagus to explain the patient's complaint of dysphagia. Biopsies were obtained from the proximal and distal esophagus with cold forceps for histology of suspected eosinophilic esophagitis. Diffuse mild inflammation characterized by congestion (edema) and erythema was found in the gastric antrum. Random biopsies were obtained in the gastric body, at the incisura and in the gastric antrum with cold forceps for histology. The duodenal bulb and second portion of the duodenum were normal.\" Most recently seen 2025: \"Still has daily nausea with vomiting 2-3 days week (multiple times/day, non bloody, sometimes bilious). Ondansetron not as helpful, promethazine is but sedating. Eating less, wt stable. Dysphagia still same (solids, liquids, pills), no odynophagia. Heartburn better.\" A/P notes with potential need for repeat scope, concern for metformin contributing to GI sx, rec to continue ondansetron and promethazine (with caution), avoid NSAIDs, H pylori eradication test on hold d/t  patient preference  Impressions of Direct Dysphagia tx: consistent with prior dysphagia status, continue    Speech Diagnosis Impressions:  Dysarthria , Cognitive-Linguistic Deficits   Skilled Treatment this date:  Motor speech: mild dysarthria suspected to be at/near baseline  Cognition: improving processing/mentation with improved recall noted; oriented x4; sustains attention independently; mod reduced alternating/divided attention; immediate recall, 100%; delayed recall 80%, 100% with semantic cue; verbal problem solving - min cues for expansion; calculations min cues for attention to details  Impressions of Direct SLP tx:  improving, nearing reported baseline  Orientation:  oriented x4  Receptive Language Processing:   WFL for concrete  Expressive Language Expression:    WFL for concrete  Motor speech/voice:   mild dysarthria characterized by reduced breath support and reduced oral motor strength and movement contributing to ~ 75% speech intelligibility at sentence level  Cognitive-linguistic:   Report for baseline impairments; appears to be nearing baseline with mildly reduced novel delayed recall and mildly reduced verbal reasoning with moderately impaired alternating/divided attention    Medical Or Cognitive/Behavioral Factors Which Can Exacerbate:   Comorbidities  Med status  GI involvements    Dysphagia Prognosis: good, guarded  Barriers to Progress: co-morbidities, GI involvements  SLP treatment Prognosis: good, guarded  Barriers to Progress:  co-morbidities, medical dx      RECOMMENDATIONS     Recommended Diet and Intervention:  Diet Solids Recommendation:  Regular texture Liquid Consistency Recommendation:  Thin liquids   Recommended form of Meds:   PO per patient preference      Compensatory Swallowing Strategies:  Eat or Feed Slowly, Remain Upright 30-45 min , Small Bites and Sips , Upright as possible with all PO intake     Eating Assistance Recommendation: Setup or clean-up assistance    Continued

## 2025-05-24 VITALS
HEART RATE: 63 BPM | HEIGHT: 65 IN | WEIGHT: 163.8 LBS | DIASTOLIC BLOOD PRESSURE: 78 MMHG | RESPIRATION RATE: 16 BRPM | SYSTOLIC BLOOD PRESSURE: 150 MMHG | BODY MASS INDEX: 27.29 KG/M2 | TEMPERATURE: 98.9 F | OXYGEN SATURATION: 96 %

## 2025-05-24 LAB
ALBUMIN SERPL-MCNC: 3.5 G/DL (ref 3.4–5)
ALP SERPL-CCNC: 144 U/L (ref 40–129)
ALT SERPL-CCNC: 357 U/L (ref 10–40)
ANION GAP SERPL CALCULATED.3IONS-SCNC: 10 MMOL/L (ref 3–16)
AST SERPL-CCNC: 217 U/L (ref 15–37)
BASOPHILS # BLD: 0.1 K/UL (ref 0–0.2)
BASOPHILS NFR BLD: 1.2 %
BILIRUB DIRECT SERPL-MCNC: 0.4 MG/DL (ref 0–0.3)
BILIRUB INDIRECT SERPL-MCNC: 0.2 MG/DL (ref 0–1)
BILIRUB SERPL-MCNC: 0.6 MG/DL (ref 0–1)
BUN SERPL-MCNC: 7 MG/DL (ref 7–20)
CALCIUM SERPL-MCNC: 8.8 MG/DL (ref 8.3–10.6)
CHLORIDE SERPL-SCNC: 109 MMOL/L (ref 99–110)
CO2 SERPL-SCNC: 24 MMOL/L (ref 21–32)
CREAT SERPL-MCNC: 0.7 MG/DL (ref 0.9–1.3)
DEPRECATED RDW RBC AUTO: 13.6 % (ref 12.4–15.4)
EOSINOPHIL # BLD: 0.1 K/UL (ref 0–0.6)
EOSINOPHIL NFR BLD: 1.2 %
GFR SERPLBLD CREATININE-BSD FMLA CKD-EPI: >90 ML/MIN/{1.73_M2}
GLUCOSE BLD-MCNC: 116 MG/DL (ref 70–99)
GLUCOSE SERPL-MCNC: 107 MG/DL (ref 70–99)
HCT VFR BLD AUTO: 40.3 % (ref 40.5–52.5)
HGB BLD-MCNC: 13.5 G/DL (ref 13.5–17.5)
LYMPHOCYTES # BLD: 3.5 K/UL (ref 1–5.1)
LYMPHOCYTES NFR BLD: 61.3 %
MCH RBC QN AUTO: 29.2 PG (ref 26–34)
MCHC RBC AUTO-ENTMCNC: 33.5 G/DL (ref 31–36)
MCV RBC AUTO: 87.1 FL (ref 80–100)
MONOCYTES # BLD: 0.4 K/UL (ref 0–1.3)
MONOCYTES NFR BLD: 7.1 %
NEUTROPHILS # BLD: 1.7 K/UL (ref 1.7–7.7)
NEUTROPHILS NFR BLD: 29.2 %
PERFORMED ON: ABNORMAL
PLATELET # BLD AUTO: 227 K/UL (ref 135–450)
PMV BLD AUTO: 7.8 FL (ref 5–10.5)
POTASSIUM SERPL-SCNC: 3.8 MMOL/L (ref 3.5–5.1)
PROT SERPL-MCNC: 6.2 G/DL (ref 6.4–8.2)
RBC # BLD AUTO: 4.62 M/UL (ref 4.2–5.9)
SODIUM SERPL-SCNC: 143 MMOL/L (ref 136–145)
WBC # BLD AUTO: 5.7 K/UL (ref 4–11)

## 2025-05-24 PROCEDURE — 85025 COMPLETE CBC W/AUTO DIFF WBC: CPT

## 2025-05-24 PROCEDURE — 94760 N-INVAS EAR/PLS OXIMETRY 1: CPT

## 2025-05-24 PROCEDURE — 80076 HEPATIC FUNCTION PANEL: CPT

## 2025-05-24 PROCEDURE — 6370000000 HC RX 637 (ALT 250 FOR IP): Performed by: HOSPITALIST

## 2025-05-24 PROCEDURE — 2580000003 HC RX 258: Performed by: INTERNAL MEDICINE

## 2025-05-24 PROCEDURE — 2500000003 HC RX 250 WO HCPCS: Performed by: HOSPITALIST

## 2025-05-24 PROCEDURE — 6370000000 HC RX 637 (ALT 250 FOR IP): Performed by: INTERNAL MEDICINE

## 2025-05-24 PROCEDURE — 6370000000 HC RX 637 (ALT 250 FOR IP): Performed by: STUDENT IN AN ORGANIZED HEALTH CARE EDUCATION/TRAINING PROGRAM

## 2025-05-24 PROCEDURE — 80048 BASIC METABOLIC PNL TOTAL CA: CPT

## 2025-05-24 PROCEDURE — 6360000002 HC RX W HCPCS: Performed by: INTERNAL MEDICINE

## 2025-05-24 PROCEDURE — 6360000002 HC RX W HCPCS: Performed by: HOSPITALIST

## 2025-05-24 PROCEDURE — 36415 COLL VENOUS BLD VENIPUNCTURE: CPT

## 2025-05-24 RX ORDER — ASPIRIN 81 MG/1
81 TABLET, CHEWABLE ORAL DAILY
Qty: 30 TABLET | Refills: 0 | Status: SHIPPED | OUTPATIENT
Start: 2025-05-25

## 2025-05-24 RX ORDER — ACETAMINOPHEN 325 MG/1
650 TABLET ORAL EVERY 8 HOURS PRN
Status: DISCONTINUED | OUTPATIENT
Start: 2025-05-24 | End: 2025-05-24 | Stop reason: HOSPADM

## 2025-05-24 RX ORDER — PANTOPRAZOLE SODIUM 40 MG/1
40 TABLET, DELAYED RELEASE ORAL
Status: DISCONTINUED | OUTPATIENT
Start: 2025-05-24 | End: 2025-05-24 | Stop reason: HOSPADM

## 2025-05-24 RX ORDER — VITAMIN B COMPLEX
2000 TABLET ORAL DAILY
Status: DISCONTINUED | OUTPATIENT
Start: 2025-05-24 | End: 2025-05-24 | Stop reason: HOSPADM

## 2025-05-24 RX ORDER — SULFAMETHOXAZOLE AND TRIMETHOPRIM 800; 160 MG/1; MG/1
1 TABLET ORAL 2 TIMES DAILY
Qty: 10 TABLET | Refills: 0 | Status: SHIPPED | OUTPATIENT
Start: 2025-05-24 | End: 2025-05-29

## 2025-05-24 RX ORDER — HYDROCHLOROTHIAZIDE 12.5 MG/1
12.5 CAPSULE ORAL DAILY
Status: DISCONTINUED | OUTPATIENT
Start: 2025-05-24 | End: 2025-05-24 | Stop reason: HOSPADM

## 2025-05-24 RX ORDER — BISMUTH SUBSALICYLATE 262 MG/1
524 TABLET, CHEWABLE ORAL
Status: DISCONTINUED | OUTPATIENT
Start: 2025-05-24 | End: 2025-05-24 | Stop reason: HOSPADM

## 2025-05-24 RX ORDER — PREGABALIN 25 MG/1
50 CAPSULE ORAL 3 TIMES DAILY
Status: DISCONTINUED | OUTPATIENT
Start: 2025-05-24 | End: 2025-05-24 | Stop reason: HOSPADM

## 2025-05-24 RX ADMIN — ACETAMINOPHEN 650 MG: 325 TABLET ORAL at 09:36

## 2025-05-24 RX ADMIN — PREGABALIN 50 MG: 25 CAPSULE ORAL at 08:21

## 2025-05-24 RX ADMIN — HYDROCHLOROTHIAZIDE 12.5 MG: 12.5 CAPSULE ORAL at 08:21

## 2025-05-24 RX ADMIN — Medication 2000 UNITS: at 08:22

## 2025-05-24 RX ADMIN — BISMUTH SUBSALICYLATE 524 MG: 262 TABLET, CHEWABLE ORAL at 10:26

## 2025-05-24 RX ADMIN — PANTOPRAZOLE SODIUM 40 MG: 40 TABLET, DELAYED RELEASE ORAL at 09:36

## 2025-05-24 RX ADMIN — TENOFOVIR DISPROXIL FUMARATE 300 MG: 300 TABLET ORAL at 08:22

## 2025-05-24 RX ADMIN — CEFEPIME 2000 MG: 2 INJECTION, POWDER, FOR SOLUTION INTRAVENOUS at 08:27

## 2025-05-24 RX ADMIN — ASPIRIN 81 MG: 81 TABLET, CHEWABLE ORAL at 08:22

## 2025-05-24 RX ADMIN — CEFEPIME 2000 MG: 2 INJECTION, POWDER, FOR SOLUTION INTRAVENOUS at 02:36

## 2025-05-24 RX ADMIN — SODIUM CHLORIDE, PRESERVATIVE FREE 10 ML: 5 INJECTION INTRAVENOUS at 08:27

## 2025-05-24 RX ADMIN — ENOXAPARIN SODIUM 40 MG: 100 INJECTION SUBCUTANEOUS at 08:22

## 2025-05-24 ASSESSMENT — PAIN SCALES - GENERAL
PAINLEVEL_OUTOF10: 7
PAINLEVEL_OUTOF10: 7

## 2025-05-24 ASSESSMENT — PAIN DESCRIPTION - DESCRIPTORS: DESCRIPTORS: ACHING

## 2025-05-24 ASSESSMENT — PAIN DESCRIPTION - ORIENTATION: ORIENTATION: ANTERIOR

## 2025-05-24 ASSESSMENT — PAIN DESCRIPTION - LOCATION: LOCATION: ABDOMEN

## 2025-05-24 NOTE — CARE COORDINATION
Discharge order noted. Chart reviewed. Patient given Mental Health resources during this stay. Therapy recommends home care, does he want it? SW reached out to bedside nurse to inquire. She will call us back momentarily.     Respectfully submitted,    Nicole CLEMENTE, BRIGID  [facility]   [phone]    Electronically signed by CHYNA Woo, MITCHELL on 5/24/2025 at 10:54 AM

## 2025-05-24 NOTE — DISCHARGE SUMMARY
V2.0  Discharge Summary    Name:  Cassia Morris /Age/Sex: 1970 (54 y.o. male)   Admit Date: 2025  Discharge Date: 25    MRN & CSN:  [id number] & [id number] Encounter Date and Time 25 9:09 AM EDT    Attending:  Benny Grimm DO Discharging Provider: Benny Grimm DO       Hospital Course:     Brief HPI: Cassia Morris is a 54 y.o. male who presented complaining of palpitations and abnormal sensation and weakness on the left side of his body.  He had a history of CVA with residual left-sided deficits however he felt that things felt different.  He was admitted for concern for acute CVA.    Brief Problem Based Course:     Abnormal sensation of left side, resolved  History of CVA  - CT head nonacute  - CTA head and neck with no large vessel occlusion  - MRI brain with and without contrast nonacute, did show old infarctions in the right occipital lobe, posterior medial right temporal lobe, right thalamus and bilateral cerebellar hemispheres  - Suspect acute stress response  - Resume aspirin 81 mg daily    Shivering, resolved  - Developed shortly after admission and had rapid response called, there was concern for infection and possible rigors however his infectious workup was grossly benign, his UDS was positive for fentanyl and cannabis and I do question if this could have been a sequela of withdrawal  - Evaluated by neurology who believe a lot of this could be acute stress response  - Empiric IV cefepime transitioned to oral Augmentin to complete antibiotic course    Chest pain  - His EKG was nonischemic  - Underwent echo which showed normal EF 60 to 65% and normal wall motion and diastolic function  - Evaluated by cardiology who recommended no further evaluation    The patient expressed appropriate understanding of, and agreement with the discharge recommendations, medications, and plan.     Consults this admission:  IP CONSULT TO NEUROLOGY  IP CONSULT TO CASE MANAGEMENT  IP CONSULT TO  The larynx and pharynx are unremarkable.  No acute abnormality of the salivary and thyroid glands. BONES: No acute osseous abnormality. CTA HEAD: ANTERIOR CIRCULATION: No significant stenosis of the intracranial internal carotid, anterior cerebral, or middle cerebral arteries. No aneurysm. POSTERIOR CIRCULATION: No significant stenosis of the basilar or posterior cerebral arteries. No aneurysm. OTHER: No dural venous sinus thrombosis on this non-dedicated study. BRAIN: No mass effect or midline shift. No extra-axial fluid collection. The gray-white differentiation is maintained.     No large vessel occlusion in the head or neck.     CT HEAD WO CONTRAST  Addendum Date: 5/21/2025  ADDENDUM: Results were reported to Dr. Díaz at 9:30 p.m. on May 21, 2025.     Result Date: 5/21/2025  EXAMINATION: CT OF THE HEAD WITHOUT CONTRAST  5/21/2025 8:51 pm TECHNIQUE: CT of the head was performed without the administration of intravenous contrast. Automated exposure control, iterative reconstruction, and/or weight based adjustment of the mA/kV was utilized to reduce the radiation dose to as low as reasonably achievable. COMPARISON: CT head November 13, 2023 HISTORY: ORDERING SYSTEM PROVIDED HISTORY: Stroke Symptoms TECHNOLOGIST PROVIDED HISTORY: Has a \"code stroke\" or \"stroke alert\" been called?->Yes Reason for exam:->Stroke Symptoms Decision Support Exception - unselect if not a suspected or confirmed emergency medical condition->Emergency Medical Condition (MA) Reason for Exam: Stroke Symptoms Additional signs and symptoms: rapid heart rate,numbness to face on left side Relevant Medical/Surgical History: Current every day smoker.  Hx of hepatitis & unspecified cerebral artery occlusion with cerebral infarction.  No relevant surgical or cancer hx. FINDINGS: BRAIN/VENTRICLES: There are old infarctions in the bilateral cerebellar hemispheres, right occipital lobe and right thalamus.  There is no acute intracranial hemorrhage, mass

## 2025-05-24 NOTE — PLAN OF CARE
Problem: Safety - Adult  Goal: Free from fall injury  Outcome: Progressing  Flowsheets (Taken 5/23/2025 2122)  Free From Fall Injury: Instruct family/caregiver on patient safety     Problem: Chronic Conditions and Co-morbidities  Goal: Patient's chronic conditions and co-morbidity symptoms are monitored and maintained or improved  Outcome: Progressing  Flowsheets (Taken 5/23/2025 0800 by Riddhi Norwood, RN)  Care Plan - Patient's Chronic Conditions and Co-Morbidity Symptoms are Monitored and Maintained or Improved:   Monitor and assess patient's chronic conditions and comorbid symptoms for stability, deterioration, or improvement   Collaborate with multidisciplinary team to address chronic and comorbid conditions and prevent exacerbation or deterioration     Problem: Discharge Planning  Goal: Discharge to home or other facility with appropriate resources  Outcome: Progressing  Flowsheets (Taken 5/23/2025 0800 by Riddhi Norwood, RN)  Discharge to home or other facility with appropriate resources:   Identify barriers to discharge with patient and caregiver   Arrange for needed discharge resources and transportation as appropriate     Problem: Pain  Goal: Verbalizes/displays adequate comfort level or baseline comfort level  Outcome: Progressing  Flowsheets (Taken 5/23/2025 2124)  Verbalizes/displays adequate comfort level or baseline comfort level:   Assess pain using appropriate pain scale   Implement non-pharmacological measures as appropriate and evaluate response     Problem: Skin/Tissue Integrity  Goal: Skin integrity remains intact  Description: 1.  Monitor for areas of redness and/or skin breakdown2.  Assess vascular access sites hourly3.  Every 4-6 hours minimum:  Change oxygen saturation probe site4.  Every 4-6 hours:  If on nasal continuous positive airway pressure, respiratory therapy assess nares and determine need for appliance change or resting period  Outcome: Progressing  Flowsheets  Taken 5/23/2025  2122 by Jodee Gu RN  Skin Integrity Remains Intact: Monitor for areas of redness and/or skin breakdown     Problem: ABCDS Injury Assessment  Goal: Absence of physical injury  Outcome: Progressing  Flowsheets (Taken 5/23/2025 2122)  Absence of Physical Injury: Implement safety measures based on patient assessment

## 2025-05-24 NOTE — CARE COORDINATION
SW received phone call back from CVICU nurse advising that patient already left with his family. She stated that he didn't need anything from us prior to departure.     Respectfully submitted,    Nicole CLEMENTE, BRIGID  [facility]   [phone]    Electronically signed by CHYNA Woo, MITCHELL on 5/24/2025 at 11:18 AM

## 2025-05-24 NOTE — PROGRESS NOTES
Pt had episode of whole body shaking that lasted 20 seconds- pt able to talk to me and answer questions while it occurred. O2 sats remain 100% on RA and HR 72, NSR. /100 but pt's arm shaking during BP. Will continue to monitor.

## 2025-05-24 NOTE — PROGRESS NOTES
NAME:  Cassia Morris  YOB: 1970  MEDICAL RECORD NUMBER:  [id number]    Shift Summary: Pt had one episode of shaking overnight, lasted 15-20 seconds and resolved on its own. Pt continues to complain of chest discomfort but unable to describe or rate on pain scale. Pt endorses exact pain that he had on admission. No EKG changes. Voiding per urinal.     Family updated: Yes:  wife    Rhythm: Normal Sinus Rhythm     Most recent vitals:   Visit Vitals  BP (!) 177/100   Pulse 67   Temp 99.1 °F (37.3 °C) (Oral)   Resp 18   Ht 1.651 m (5' 5\")   Wt 74.3 kg (163 lb 12.8 oz)   SpO2 96%   BMI 27.26 kg/m²           No data found.    No data found.      Respiratory support needed (if any):  - RA    Admission weight Weight - Scale: 76.4 kg (168 lb 6.9 oz) (05/21/25 2117)    Today's weight    Wt Readings from Last 1 Encounters:   05/23/25 74.3 kg (163 lb 12.8 oz)        Estevez need assessed each shift: N/A - no estevez present  UOP >30ml/hr: YES  Last documented BM (in last 48 hrs):  Patient Vitals for the past 48 hrs:   Last BM (including prior to admit)   05/22/25 2000 05/21/25 05/23/25 2000 05/21/25 05/24/25 0000 05/21/25                Restraints (in use currently or dc'd in last 12 hrs): No    Order current and documentation up to date? No    Lines/Drains reviewed @ bedside.  Peripheral IV 05/21/25 Left Antecubital (Active)   Number of days: 2       Peripheral IV 05/21/25 Right Hand (Active)   Number of days: 2         Drip rates at handoff:    sodium chloride Stopped (05/24/25 0236)    dextrose         Lab Data:   CBC:   Recent Labs     05/22/25  1002 05/23/25  0324   WBC 8.9 6.1   HGB 14.4 13.2*   HCT 44.0 40.7   MCV 87.9 88.6    240     BMP:    Recent Labs     05/22/25 1002 05/23/25  0324    137   K 3.6 4.1   CO2 27 24   BUN 8 7   CREATININE 0.8* 0.8*     ABG: No results for input(s): \"PHART\", \"WDW2HSJ\", \"PO2ART\" in the last 72 hours.    Any consults during the shift? No    Any signed and held  orders to be released?  No        4 Eyes Skin Assessment       The patient is being assessed for  Shift Handoff    I agree that at least one RN has performed a thorough Head to Toe Skin Assessment on the patient. ALL assessment sites listed below have been assessed.      Areas assessed by both nurses: Head, Face, Ears, Shoulders, Back, Chest, Arms, Elbows, Hands, Sacrum. Buttock, Coccyx, Ischium, Legs. Feet and Heels, and Under Medical Devices         Does the Patient have a Wound? No noted wound(s)    Wound Care Orders initiated or active by RN: No       Ranjith Prevention initiated or active: No    Pressure Injury (Stage 3,4, Unstageable, DTI, NWPT, and Complex wounds): No  If present, place \"IP Wound Care/Ostomy Nurse Eval and Treat\" in : No    Ostomies present: No  If new Ostomy, place \"IP Wound Care/Ostomy Nurse Eval and Treat\" in : No     Nurse 1 eSignature: Electronically signed by Jodee Gu RN on 5/24/25 at 7:37 AM EDT    **SHARE this note so that the co-signing nurse can place an eSignature**    Nurse 2 eSignature: Electronically signed by Mira Nieves RN on 5/24/25 at 8:37 AM EDT

## 2025-05-31 NOTE — PROGRESS NOTES
Physician Progress Note      PATIENT:               LAILA COTTRELL  CSN #:                  [id number]  :                       1970  ADMIT DATE:       2025 8:40 PM  DISCH DATE:        2025 10:30 AM  RESPONDING  PROVIDER #:        Benny Grimm DO          QUERY TEXT:    MRI brain with and without contrast nonacute is documented in the medical   record DC summary . Please clarify the status of this condition:    The clinical indicators include:  -- Admitted with palpitations and abnormal sensation and weakness on the left   side of his body. History of CVA with residual left-sided deficits.  -- DC summary  \"Abnormal sensation of left side, resolved  History of CVA  CT head nonacute  CTA head and neck with no large vessel occlusion  MRI brain with and without contrast nonacute, did show old infarctions in the   right occipital lobe, posterior medial right temporal lobe, right thalamus and   bilateral cerebellar hemispheres  Suspect acute stress response\"  -- MRI BRAIN   \"No acute intracranial abnormality.  Old infarctions in the right occipital lobe, posteromedial right temporal  lobe, right thalamus and bilateral cerebellar hemispheres\"  -- MRI, CT, CTA, neurology consult, UDS, cardiology consult, neurology   consult, serial labs, and supportive care  Options provided:  -- CVA is ruled out  -- CVA is unable to be ruled out  -- palpitations and abnormal sensation and weakness on the left side of his   body is due to Sequela of CVA confirmed  -- Other - I will add my own diagnosis  -- Disagree - Not applicable / Not valid  -- Disagree - Clinically unable to determine / Unknown  -- Refer to Clinical Documentation Reviewer    PROVIDER RESPONSE TEXT:    CVA is ruled out    Query created by: Lissette Hope on 2025 8:00 AM      QUERY TEXT:    Elevated cardiac troponin (cTc) levels are documented in the medical record .   Please clarify the cause:    The clinical indicators include:  --  Admitted with palpitations and abnormal sensation and weakness on the left   side of his body. History of CVA with residual left-sided deficits.  -- Troponins 15/23/22/26, -206  -- Cardiology 05/22 \"Hypertensive urgency....Low-level troponin elevation   likely attributable to persistent hypertensive state...Blood pressure   parameters per Neurology given stroke history and current workup..\"  -- Neurology 05/22 \" I am not quite sure if his elevated blood pressure can   explain all of his symptoms.\"  -- Serial troponins, cardiology consult, supportive care  Options provided:  -- Myocardial injury, non-ischemic (non-traumatic) related to hypertensive   urgency  -- Other - I will add my own diagnosis  -- Disagree - Not applicable / Not valid  -- Disagree - Clinically unable to determine / Unknown  -- Refer to Clinical Documentation Reviewer    PROVIDER RESPONSE TEXT:    This patient has myocardial injury, non-ischemic (non-traumatic related to   hypertensive urgency    Query created by: Lissette Hope on 5/27/2025 8:08 AM      Electronically signed by:  Benny Grimm DO 5/31/2025 9:41 AM

## 2025-06-04 NOTE — PROGRESS NOTES
Physician Progress Note      PATIENT:               LAILA COTTRELL  [facility] #:                  [id number]  :                       1970  ADMIT DATE:       2025 8:40 PM  DISCH DATE:        2025 10:30 AM  RESPONDING  PROVIDER #:        Benny Grimm DO          QUERY TEXT:    Please clarify in documentation the relationship, if any, between palpitation,   normal left sided sensations, and weakness and remote CVA, \"stress response\"   and withdrawal. Are the conditions:    The clinical indicators include:  -- Admitted with palpitations and abnormal sensation and weakness on the left   side of his body.  History of CVA with residual left-sided deficits.  -- UDS + fentanyl/cannabinoids,  -- MRI  \"Old infarctions in the right occipital lobe, posteromedial right   temporal lobe, right thalamus and bilateral cerebellar hemispheres.\"  -- DC summary  \"Abnormal sensation of left side, resolved. History of   CVA- Suspect acute stress response.......... UDS was positive for fentanyl and   cannabis and I do question if this could have been a sequela of withdrawal-   Evaluated by neurology who believe a lot of this could be acute stress   response\"  -- Neurology  \"Wife tells me that he has a lot of anxiety and patient   endorses depression.........Given recent anxiety and depression expressed by   patient and wife I do wonder if there is a functional component to some of his   symptoms. If the remainder of his work up were to be unrevealing. I am not   quite sure if his elevated blood pressure can explain all of his symptoms.\"  -- CT, CTA, MRI, ASA, UDS, Ativan, cefepime, serial labs and supportive care  Options provided:  -- palpitations and abnormal sensation and weakness multifactorial, likely due   to anxiety and withdrawal  -- palpitations and abnormal sensation and weakness likely due to anxiety and   depression secondary to recent stressors  -- palpitations and abnormal sensation and weakness likely